# Patient Record
Sex: FEMALE | Race: WHITE | Employment: PART TIME | URBAN - METROPOLITAN AREA
[De-identification: names, ages, dates, MRNs, and addresses within clinical notes are randomized per-mention and may not be internally consistent; named-entity substitution may affect disease eponyms.]

---

## 2017-01-16 ENCOUNTER — OFFICE VISIT (OUTPATIENT)
Dept: ORTHOPEDIC SURGERY | Age: 27
End: 2017-01-16

## 2017-01-16 ENCOUNTER — TELEPHONE (OUTPATIENT)
Dept: INTERNAL MEDICINE | Age: 27
End: 2017-01-16

## 2017-01-16 VITALS
HEART RATE: 75 BPM | WEIGHT: 115 LBS | SYSTOLIC BLOOD PRESSURE: 105 MMHG | DIASTOLIC BLOOD PRESSURE: 71 MMHG | BODY MASS INDEX: 18.48 KG/M2 | HEIGHT: 66 IN

## 2017-01-16 DIAGNOSIS — M89.8X1 PAIN OF RIGHT CLAVICLE: ICD-10-CM

## 2017-01-16 DIAGNOSIS — T14.8XXA CONTUSION OF CLAVICLE, RIGHT, INITIAL ENCOUNTER: Primary | ICD-10-CM

## 2017-01-16 DIAGNOSIS — M25.511 ACUTE PAIN OF RIGHT SHOULDER: Primary | ICD-10-CM

## 2017-01-16 PROCEDURE — 73000 X-RAY EXAM OF COLLAR BONE: CPT | Performed by: INTERNAL MEDICINE

## 2017-01-16 PROCEDURE — 99214 OFFICE O/P EST MOD 30 MIN: CPT | Performed by: INTERNAL MEDICINE

## 2017-01-16 PROCEDURE — MISC325 DJO DELUXE SHOULDER SLING: Performed by: INTERNAL MEDICINE

## 2017-01-17 ENCOUNTER — TELEPHONE (OUTPATIENT)
Dept: ORTHOPEDIC SURGERY | Age: 27
End: 2017-01-17

## 2018-02-14 ENCOUNTER — TELEPHONE (OUTPATIENT)
Dept: ORTHOPEDIC SURGERY | Age: 28
End: 2018-02-14

## 2018-02-14 NOTE — TELEPHONE ENCOUNTER
Faxed medical records for [facility] to [facility] @ [phone] and [address] @ [phone]

## 2018-03-19 ENCOUNTER — TELEPHONE (OUTPATIENT)
Dept: ORTHOPEDIC SURGERY | Age: 28
End: 2018-03-19

## 2018-05-07 ENCOUNTER — TELEPHONE (OUTPATIENT)
Dept: ORTHOPEDIC SURGERY | Age: 28
End: 2018-05-07

## 2019-05-23 ENCOUNTER — OFFICE VISIT (OUTPATIENT)
Dept: INTERNAL MEDICINE CLINIC | Age: 29
End: 2019-05-23
Payer: MEDICAID

## 2019-05-23 VITALS
SYSTOLIC BLOOD PRESSURE: 112 MMHG | WEIGHT: 116 LBS | HEART RATE: 119 BPM | DIASTOLIC BLOOD PRESSURE: 64 MMHG | OXYGEN SATURATION: 99 % | BODY MASS INDEX: 18.72 KG/M2

## 2019-05-23 DIAGNOSIS — R53.82 CHRONIC FATIGUE: ICD-10-CM

## 2019-05-23 DIAGNOSIS — G90.512 COMPLEX REGIONAL PAIN SYNDROME TYPE 1 OF LEFT UPPER EXTREMITY: ICD-10-CM

## 2019-05-23 DIAGNOSIS — M25.532 CHRONIC WRIST PAIN, LEFT: ICD-10-CM

## 2019-05-23 DIAGNOSIS — D50.8 OTHER IRON DEFICIENCY ANEMIA: ICD-10-CM

## 2019-05-23 DIAGNOSIS — G89.29 CHRONIC WRIST PAIN, LEFT: ICD-10-CM

## 2019-05-23 DIAGNOSIS — G90.529 COMPLEX REGIONAL PAIN SYNDROME TYPE 1 OF LOWER EXTREMITY, UNSPECIFIED LATERALITY: ICD-10-CM

## 2019-05-23 DIAGNOSIS — Z00.00 ROUTINE GENERAL MEDICAL EXAMINATION AT A HEALTH CARE FACILITY: Primary | ICD-10-CM

## 2019-05-23 DIAGNOSIS — D22.9 ATYPICAL NEVUS: ICD-10-CM

## 2019-05-23 PROCEDURE — 99395 PREV VISIT EST AGE 18-39: CPT | Performed by: INTERNAL MEDICINE

## 2019-05-23 RX ORDER — AMITRIPTYLINE HYDROCHLORIDE 25 MG/1
25 TABLET, FILM COATED ORAL NIGHTLY
Qty: 30 TABLET | Refills: 0
Start: 2019-05-23

## 2019-05-23 SDOH — HEALTH STABILITY: MENTAL HEALTH: HOW OFTEN DO YOU HAVE A DRINK CONTAINING ALCOHOL?: NEVER

## 2019-05-23 ASSESSMENT — PATIENT HEALTH QUESTIONNAIRE - PHQ9
SUM OF ALL RESPONSES TO PHQ9 QUESTIONS 1 & 2: 0
SUM OF ALL RESPONSES TO PHQ QUESTIONS 1-9: 0
SUM OF ALL RESPONSES TO PHQ QUESTIONS 1-9: 0
1. LITTLE INTEREST OR PLEASURE IN DOING THINGS: 0
2. FEELING DOWN, DEPRESSED OR HOPELESS: 0

## 2019-05-23 NOTE — PROGRESS NOTES
Subjective:      Patient ID: Rylan Rubio is a 34 y.o. y.o. female. HPI    Rylan Rubio is here for a physical.    Chief Complaint   Patient presents with    Annual Exam       Vitals:    05/23/19 1555   BP: 112/64   Pulse: 119   SpO2: 99%       Wt Readings from Last 3 Encounters:   05/23/19 116 lb (52.6 kg)   01/16/17 115 lb (52.2 kg)   09/12/16 114 lb (51.7 kg)       Past Medical History:   Diagnosis Date    Cerebral palsy (HCC)     Complex regional pain syndrome     Iron deficiency anemia        Past Surgical History:   Procedure Laterality Date    SPINE SURGERY      Spinal stimulator    WISDOM TOOTH EXTRACTION         History reviewed. No pertinent family history. Social History     Tobacco Use    Smoking status: Never Smoker    Smokeless tobacco: Never Used   Substance Use Topics    Alcohol use: Never     Frequency: Never    Drug use: No       Immunization History   Administered Date(s) Administered    IPV (Ipol) 12/30/2015    MMR 12/23/2015    Td, unspecified formulation 12/15/2015    Yellow Fever 12/30/2015       Health Maintenance   Topic Date Due    Varicella Vaccine (1 of 2 - 13+ 2-dose series) 08/24/2005    HPV vaccine (1 - Female 3-dose series) 08/24/2007    HIV screen  08/24/2007    Cervical cancer screen  04/29/2017    DTaP/Tdap/Td vaccine (1 - Tdap) 06/13/2019 (Originally 12/16/2015)    Flu vaccine (Season Ended) 09/01/2019    Pneumococcal 0-64 years Vaccine  Aged Out       Discussed over the counter medication with patient. Discussed use, benefit, and side effects of prescribed medications. Barriers to medication compliance addressed. Allpatient questions answered. Pt voiced understanding. Medications reviewed and patient understands. Questions answered    Review of Systems   Constitutional: Negative for activity change, appetite change, chills, diaphoresis, fatigue, fever and unexpected weight change.    HENT: Negative for congestion, dental problem, drooling, ear discharge, ear pain, facial swelling, hearing loss, mouth sores, nosebleeds, postnasal drip, rhinorrhea, sinus pressure, sneezing, sore throat, tinnitus, trouble swallowing and voice change. Eyes: Negative for photophobia, pain, discharge, redness, itching and visual disturbance. Respiratory: Negative for apnea, cough, choking, chest tightness, shortness of breath, wheezing and stridor. Cardiovascular: Negative for chest pain, palpitations and leg swelling. Gastrointestinal: Negative for abdominal distention, abdominal pain, anal bleeding, blood in stool, constipation, diarrhea, nausea, rectal pain and vomiting. Genitourinary: Negative for decreased urine volume, difficulty urinating, dyspareunia, dysuria, enuresis, flank pain, frequency, genital sores, hematuria, menstrual problem, pelvic pain, urgency, vaginal bleeding, vaginal discharge and vaginal pain. Musculoskeletal: Negative for arthralgias, back pain, gait problem, joint swelling, myalgias, neck pain and neck stiffness. Skin: Negative for color change, pallor, rash and wound. Neurological: Negative for dizziness, tremors, seizures, syncope, facial asymmetry, speech difficulty, weakness, light-headedness, numbness and headaches. Hematological: Negative for adenopathy. Does not bruise/bleed easily. Objective:   Physical Exam   Constitutional: She is oriented to person, place, and time. She appears well-developed and well-nourished. HENT:   Head: Normocephalic and atraumatic. Right Ear: Tympanic membrane and ear canal normal.   Left Ear: Tympanic membrane and ear canal normal.   Nose: Nose normal.   Mouth/Throat: Uvula is midline, oropharynx is clear and moist and mucous membranes are normal.   Eyes: Pupils are equal, round, and reactive to light. Conjunctivae, EOM and lids are normal.   Fundi exam is normal   Neck: Normal range of motion and full passive range of motion without pain. No JVD present.  No muscular tenderness present. Carotid bruit is not present. No tracheal deviation and no edema present. No thyroid mass and no thyromegaly present. Cardiovascular: Normal rate, regular rhythm, normal heart sounds and intact distal pulses. No murmur heard. Pulmonary/Chest: Effort normal and breath sounds normal.   Patient refuses breast exam     Abdominal: Soft. Bowel sounds are normal. There is no hepatosplenomegaly. There is no tenderness. No hernia. Hernia confirmed negative in the ventral area. Genitourinary: No breast tenderness or discharge. Genitourinary Comments: Patient refuses pap   Musculoskeletal: Normal range of motion. Neurological: She is alert and oriented to person, place, and time. She has normal strength and normal reflexes. No cranial nerve deficit or sensory deficit. Skin: Skin is warm and dry. Patient advised to do a monthly mole check       Assessment:      See ProblemList assessment and plan       Plan:      Routine general medical examination at a health care facility  Reviewed diet and exercise  Check labs  Reviewed immunizations  Referral to Gyn      Iron deficiency anemia  Check labs         Patient engaged in shared decision making. Information given to evaluate options of treatment, understand what is needed and discuss importance of following plan.

## 2019-05-24 ASSESSMENT — ENCOUNTER SYMPTOMS
VOMITING: 0
TROUBLE SWALLOWING: 0
SORE THROAT: 0
EYE REDNESS: 0
NAUSEA: 0
EYE PAIN: 0
VOICE CHANGE: 0
EYE DISCHARGE: 0
FACIAL SWELLING: 0
CONSTIPATION: 0
ABDOMINAL DISTENTION: 0
RECTAL PAIN: 0
STRIDOR: 0
SHORTNESS OF BREATH: 0
EYE ITCHING: 0
COUGH: 0
CHEST TIGHTNESS: 0
DIARRHEA: 0
ABDOMINAL PAIN: 0
WHEEZING: 0
RHINORRHEA: 0
ANAL BLEEDING: 0
BACK PAIN: 0
APNEA: 0
SINUS PRESSURE: 0
BLOOD IN STOOL: 0
CHOKING: 0
PHOTOPHOBIA: 0
COLOR CHANGE: 0

## 2019-05-24 NOTE — ASSESSMENT & PLAN NOTE
Reviewed diet and exercise  Check labs  Reviewed immunizations  Referral to [facility]

## 2019-05-27 RX ORDER — IRON ASPGLY,PS/C/SUCCINIC ACID 150-50-50
1 CAPSULE ORAL 2 TIMES DAILY
Qty: 60 CAPSULE | Refills: 2 | Status: ON HOLD
Start: 2019-05-27 | End: 2020-02-21

## 2019-06-24 ENCOUNTER — TELEPHONE (OUTPATIENT)
Dept: GYNECOLOGY | Age: 29
End: 2019-06-24

## 2019-06-24 NOTE — TELEPHONE ENCOUNTER
Patient is calling to request an earlier appointment date. She will be a NEW patient on July 16th. Patient states her breasts are very dense and she feels lumps. Patient states she has lost 20 pounds. Patient would like to be sooner. Patient can be reached at [phone].

## 2019-06-25 ENCOUNTER — OFFICE VISIT (OUTPATIENT)
Dept: GYNECOLOGY | Age: 29
End: 2019-06-25
Payer: MEDICAID

## 2019-06-25 VITALS
OXYGEN SATURATION: 98 % | RESPIRATION RATE: 16 BRPM | HEART RATE: 80 BPM | DIASTOLIC BLOOD PRESSURE: 62 MMHG | TEMPERATURE: 97.3 F | HEIGHT: 66 IN | WEIGHT: 110 LBS | SYSTOLIC BLOOD PRESSURE: 100 MMHG | BODY MASS INDEX: 17.68 KG/M2

## 2019-06-25 DIAGNOSIS — N63.20 LEFT BREAST LUMP: ICD-10-CM

## 2019-06-25 DIAGNOSIS — N92.0 MENORRHAGIA WITH REGULAR CYCLE: ICD-10-CM

## 2019-06-25 DIAGNOSIS — N64.59 INVERTED NIPPLE: ICD-10-CM

## 2019-06-25 DIAGNOSIS — Z01.419 WELL WOMAN EXAM WITH ROUTINE GYNECOLOGICAL EXAM: Primary | ICD-10-CM

## 2019-06-25 PROCEDURE — 99385 PREV VISIT NEW AGE 18-39: CPT | Performed by: OBSTETRICS & GYNECOLOGY

## 2019-06-25 RX ORDER — METHADONE HYDROCHLORIDE 5 MG/1
TABLET ORAL
COMMUNITY
Start: 2019-06-17 | End: 2019-08-13

## 2019-06-25 ASSESSMENT — ENCOUNTER SYMPTOMS
RESPIRATORY NEGATIVE: 1
GASTROINTESTINAL NEGATIVE: 1
EYES NEGATIVE: 1

## 2019-06-25 NOTE — PROGRESS NOTES
Subjective:      Patient ID: Sloane Christensen is a 34 y.o. female. Patient is here for annual. Patient with hx inverted nipples bilaterally-feels like getting worse. Hx breast lump left side. Feels like losing weight. Does have a chronic pain syndrome. States cycles slightly heavy-questions about hormones. Review of Systems   Constitutional: Positive for unexpected weight change. HENT: Negative. Eyes: Negative. Respiratory: Negative. Cardiovascular: Negative. Gastrointestinal: Negative. Genitourinary: Positive for menstrual problem. Musculoskeletal: Positive for arthralgias and myalgias. Skin: Negative. Neurological: Negative. Psychiatric/Behavioral: Negative.       Date of Birth 1990  Past Medical History:   Diagnosis Date    Cerebral palsy ([facility] [address]     Complex regional pain syndrome     Iron deficiency anemia      Past Surgical History:   Procedure Laterality Date    SPINE SURGERY      Spinal stimulator    WISDOM TOOTH EXTRACTION       OB History    Para Term  AB Living   0 0 0 0 0 0   SAB TAB Ectopic Molar Multiple Live Births   0 0 0 0 0 0     Social History     Socioeconomic History    Marital status: Single     Spouse name: Not on file    Number of children: 0    Years of education: Not on file    Highest education level: Not on file   Occupational History    Occupation: student   Social Needs    Financial resource strain: Not on file    Food insecurity:     Worry: Not on file     Inability: Not on file    Transportation needs:     Medical: Not on file     Non-medical: Not on file   Tobacco Use    Smoking status: Never Smoker    Smokeless tobacco: Never Used   Substance and Sexual Activity    Alcohol use: Never     Frequency: Never    Drug use: No    Sexual activity: Not on file   Lifestyle    Physical activity:     Days per week: Not on file     Minutes per session: Not on file    Stress: Not on file   Relationships    Social connections: Talks on phone: Not on file     Gets together: Not on file     Attends Protestant service: Not on file     Active member of club or organization: Not on file     Attends meetings of clubs or organizations: Not on file     Relationship status: Not on file    Intimate partner violence:     Fear of current or ex partner: Not on file     Emotionally abused: Not on file     Physically abused: Not on file     Forced sexual activity: Not on file   Other Topics Concern    Not on file   Social History Narrative    Exercise: swim, running    Seatbelt use: yes    Caffeine intake: one cup coffee daily                 Allergies   Allergen Reactions    Phenergan [Promethazine]      Pt mother had a really bad reaction and was hospitalized pt has always been told to say she could not have it     Outpatient Medications Marked as Taking for the 6/25/19 encounter (Office Visit) with Liane Valenzuela MD   Medication Sig Dispense Refill    methadone (DOLOPHINE) 5 MG tablet       Fe-Succ Ac-C-Thre Ac (NIFEREX) 150-50-50 MG CAPS Take 1 capsule by mouth 2 times daily 60 capsule 2    amitriptyline (ELAVIL) 25 MG tablet Take 1 tablet by mouth nightly 30 tablet 0     History reviewed. No pertinent family history. /62   Pulse 80   Temp 97.3 °F (36.3 °C)   Resp 16   Ht 5' 6\" (1.676 m)   Wt 110 lb (49.9 kg)   SpO2 98%   BMI 17.75 kg/m²       Objective:   Physical Exam   Constitutional: She is oriented to person, place, and time. She appears well-developed and well-nourished. No distress. HENT:   Head: Normocephalic and atraumatic. Eyes: Pupils are equal, round, and reactive to light. EOM are normal.   Neck: Normal range of motion. Neck supple. No thyromegaly present. Cardiovascular: Normal rate, regular rhythm and normal heart sounds. Exam reveals no gallop and no friction rub. No murmur heard. Pulmonary/Chest: Effort normal and breath sounds normal. No respiratory distress. She has no wheezes. She has no rales. Right breast exhibits inverted nipple. Left breast exhibits inverted nipple and mass. No breast tenderness, discharge or bleeding. Abdominal: Soft. She exhibits no distension and no mass. There is no hepatomegaly. There is no tenderness. There is no rebound and no guarding. No hernia. Genitourinary: Vagina normal and uterus normal. Rectal exam shows no external hemorrhoid and no fissure. No breast tenderness, discharge or bleeding. Pelvic exam was performed with patient supine. No labial fusion. There is no rash, tenderness, lesion or injury on the right labia. There is no rash, tenderness, lesion or injury on the left labia. Uterus is not deviated, not enlarged, not fixed and not tender. Cervix exhibits no motion tenderness, no discharge and no friability. Right adnexum displays no mass, no tenderness and no fullness. Left adnexum displays no mass, no tenderness and no fullness. No erythema, tenderness or bleeding in the vagina. No foreign body in the vagina. No signs of injury around the vagina. No vaginal discharge found. Genitourinary Comments: Normal urethral meatus, nl urethra, nl bladder. Musculoskeletal: Normal range of motion. Lymphadenopathy:        Right: No inguinal adenopathy present. Left: No inguinal adenopathy present. Neurological: She is alert and oriented to person, place, and time. She has normal reflexes. Skin: Skin is warm and dry. Psychiatric: She has a normal mood and affect. Her behavior is normal. Judgment and thought content normal.       Assessment:      1. Annual  2. Inverted nipples  3. Left breast lump 8 mm  4. Menorrhagia  5. Weight loss      Plan:      1. Pap, calcium, exercise  2. And 3. Referral to Dr. Alem Watts for evaluation-may need imaging  4. Check hormone levels  5.  Continue to see PCP        Erasmo Clifford MD

## 2019-07-02 ENCOUNTER — HOSPITAL ENCOUNTER (OUTPATIENT)
Dept: ULTRASOUND IMAGING | Age: 29
Discharge: HOME | End: 2019-07-02
Payer: MEDICAID

## 2019-07-02 ENCOUNTER — HOSPITAL ENCOUNTER (OUTPATIENT)
Dept: MAMMOGRAPHY | Age: 29
Discharge: HOME | End: 2019-07-02
Payer: MEDICAID

## 2019-07-02 ENCOUNTER — OFFICE VISIT (OUTPATIENT)
Dept: SURGERY | Age: 29
End: 2019-07-02
Payer: MEDICAID

## 2019-07-02 ENCOUNTER — TELEPHONE (OUTPATIENT)
Dept: SURGERY | Age: 29
End: 2019-07-02

## 2019-07-02 VITALS
SYSTOLIC BLOOD PRESSURE: 105 MMHG | HEART RATE: 81 BPM | WEIGHT: 113.6 LBS | HEIGHT: 66 IN | BODY MASS INDEX: 18.26 KG/M2 | DIASTOLIC BLOOD PRESSURE: 69 MMHG

## 2019-07-02 DIAGNOSIS — N63.20 LEFT BREAST MASS: ICD-10-CM

## 2019-07-02 DIAGNOSIS — R92.8 ABNORMAL MAMMOGRAM: ICD-10-CM

## 2019-07-02 DIAGNOSIS — N64.59 INVERSION OF BOTH NIPPLES: ICD-10-CM

## 2019-07-02 DIAGNOSIS — N63.20 LEFT BREAST MASS: Primary | ICD-10-CM

## 2019-07-02 PROCEDURE — G8419 CALC BMI OUT NRM PARAM NOF/U: HCPCS | Performed by: SURGERY

## 2019-07-02 PROCEDURE — 99243 OFF/OP CNSLTJ NEW/EST LOW 30: CPT | Performed by: SURGERY

## 2019-07-02 PROCEDURE — G0279 TOMOSYNTHESIS, MAMMO: HCPCS

## 2019-07-02 PROCEDURE — G8427 DOCREV CUR MEDS BY ELIG CLIN: HCPCS | Performed by: SURGERY

## 2019-07-02 PROCEDURE — 76642 ULTRASOUND BREAST LIMITED: CPT

## 2019-07-02 NOTE — TELEPHONE ENCOUNTER
Breast History:  History of Previous Breast Biopsy:no  Self Breast Exams Completed:yes-  Family History of Breast Cancer:yes-  Great grandmother and her sisters    Family History of Other Cancers:yes-  Pateral grandfather  lung cancer   Ashkenazi [employer] Decent:no  Bra Size: 28 A    Gyne History:  : 0,   Para: 0  Age of Menarche: 17 years  Age of Menopause: N/A years   Age of first live Birth: 0 years  History of Hysterectomy / ENRIQUE-BSO: No  History of OCP's/HRT:No   When and how long:  No  Family History or personal history of Ovarian Cancer: Unknown if cervical or ovarian cancer

## 2019-07-02 NOTE — PROGRESS NOTES
Subjective:      Patient ID: Muna Waters is a 34 y.o. female. HPI   Chief Complaint   Patient presents with    New Patient     Patient presents from Dr Odilia Watkins for left breast lump and inverted nipples     Patient has a 4 year history of bilateral inverted nipples, and she feels like it is getting worse. Has occasional bilateral clear nipple discharge with compression. She also has a left breast mass x 6 months, and this was imaged by u/s 6 months ago in [place]. Here for follow up. The mass has not changed. Breast History:  History of Previous Breast Biopsy:no  Self Breast Exams Completed:yes-  Family History of Breast Cancer:yes-  Great grandmother and her great GM's sisters    Family History of Other Cancers:yes-  Pateral grandfather  lung cancer   Ashkenazi [employer] Decent:no  Bra Size: 28 A     Gyne History:  : 0,   Para: 0  Age of Menarche: 17 years  Age of Menopause: N/A years   Age of first live Birth: 2 years  History of Hysterectomy / ENRIQUE-BSO: No  History of OCP's/HRT:No   When and how long:  No  Family History or personal history of Ovarian Cancer: Unknown if cervical or ovarian cancer       Past Medical History:   Diagnosis Date    Cerebral palsy ([address]     Complex regional pain syndrome     Iron deficiency anemia        Past Surgical History:   Procedure Laterality Date    SPINE SURGERY      Spinal stimulator    WISDOM TOOTH EXTRACTION         Current Outpatient Medications   Medication Sig Dispense Refill    methadone (DOLOPHINE) 5 MG tablet       Fe-Succ Ac-C-Thre Ac (NIFEREX) 150-50-50 MG CAPS Take 1 capsule by mouth 2 times daily 60 capsule 2    amitriptyline (ELAVIL) 25 MG tablet Take 1 tablet by mouth nightly 30 tablet 0     No current facility-administered medications for this visit.         Social History     Socioeconomic History    Marital status: Single     Spouse name: Not on file    Number of children: 0    Years of education: Not on file    Highest education There is no hepatosplenomegaly. Musculoskeletal: Normal range of motion. She exhibits no edema or tenderness. Lymphadenopathy:     She has no cervical adenopathy. She has no axillary adenopathy. Neurological: She is alert and oriented to person, place, and time. No cranial nerve deficit. Coordination normal.   Skin: Skin is warm and dry. No rash noted. She is not diaphoretic. No erythema. No pallor. Psychiatric: She has a normal mood and affect. Her behavior is normal. Judgment and thought content normal.   bilateral nipple inversion, no nipple masses, no skin lesions or discharge noted. Left breast with 8 mm round mass 2:00, 6 cmfn. Right breast - no masses    Assessment:       Diagnosis Orders   1. Left breast mass     2. Inversion of both nipples             Plan:      I recommend we obtain a bilateral diagnostic mammogram and left breast u/s today. Further recommendations based on imaging findings.          Racquel Gayle MD

## 2019-07-05 ENCOUNTER — HOSPITAL ENCOUNTER (OUTPATIENT)
Dept: MAMMOGRAPHY | Age: 29
Discharge: HOME | End: 2019-07-05
Payer: MEDICAID

## 2019-07-05 ENCOUNTER — HOSPITAL ENCOUNTER (OUTPATIENT)
Dept: ULTRASOUND IMAGING | Age: 29
Discharge: HOME | End: 2019-07-05
Payer: MEDICAID

## 2019-07-05 DIAGNOSIS — R92.8 ABNORMAL MAMMOGRAM: ICD-10-CM

## 2019-07-05 DIAGNOSIS — N63.0 BREAST MASS: ICD-10-CM

## 2019-07-05 PROCEDURE — 77065 DX MAMMO INCL CAD UNI: CPT

## 2019-07-05 PROCEDURE — 2709999900 US BREAST BIOPSY W LOC DEVICE 1ST LESION LEFT

## 2019-07-05 PROCEDURE — 88305 TISSUE EXAM BY PATHOLOGIST: CPT

## 2019-07-09 ENCOUNTER — OFFICE VISIT (OUTPATIENT)
Dept: SURGERY | Age: 29
End: 2019-07-09
Payer: MEDICAID

## 2019-07-09 VITALS
WEIGHT: 113 LBS | DIASTOLIC BLOOD PRESSURE: 66 MMHG | HEART RATE: 88 BPM | SYSTOLIC BLOOD PRESSURE: 109 MMHG | BODY MASS INDEX: 18.24 KG/M2

## 2019-07-09 DIAGNOSIS — N63.20 LEFT BREAST MASS: Primary | ICD-10-CM

## 2019-07-09 PROCEDURE — 1036F TOBACCO NON-USER: CPT | Performed by: SURGERY

## 2019-07-09 PROCEDURE — G8427 DOCREV CUR MEDS BY ELIG CLIN: HCPCS | Performed by: SURGERY

## 2019-07-09 PROCEDURE — 99214 OFFICE O/P EST MOD 30 MIN: CPT | Performed by: SURGERY

## 2019-07-09 PROCEDURE — G8419 CALC BMI OUT NRM PARAM NOF/U: HCPCS | Performed by: SURGERY

## 2019-07-09 NOTE — PATIENT INSTRUCTIONS
Pathology results were discussed  Recommendation is excision of fibroadenoma left breast  Surgical procedure for excision was explained  Outpatient procedure with MAC anesthesia  Risks and benefits of surgery were explained  Consent for surgery was signed  Avoid Vitamin E, Fish Oil and Tumeric prior to surgery

## 2019-07-10 ENCOUNTER — TELEPHONE (OUTPATIENT)
Dept: INTERNAL MEDICINE CLINIC | Age: 29
End: 2019-07-10

## 2019-07-29 ENCOUNTER — OFFICE VISIT (OUTPATIENT)
Dept: INTERNAL MEDICINE CLINIC | Age: 29
End: 2019-07-29
Payer: MEDICAID

## 2019-07-29 VITALS
OXYGEN SATURATION: 97 % | WEIGHT: 105 LBS | HEIGHT: 66 IN | BODY MASS INDEX: 16.88 KG/M2 | DIASTOLIC BLOOD PRESSURE: 62 MMHG | HEART RATE: 90 BPM | SYSTOLIC BLOOD PRESSURE: 102 MMHG

## 2019-07-29 DIAGNOSIS — G90.529 COMPLEX REGIONAL PAIN SYNDROME TYPE 1 OF LOWER EXTREMITY, UNSPECIFIED LATERALITY: ICD-10-CM

## 2019-07-29 DIAGNOSIS — Z01.818 PREOP EXAMINATION: ICD-10-CM

## 2019-07-29 DIAGNOSIS — Z01.818 PRE-OP EXAMINATION: Primary | ICD-10-CM

## 2019-07-29 DIAGNOSIS — D50.8 OTHER IRON DEFICIENCY ANEMIA: ICD-10-CM

## 2019-07-29 DIAGNOSIS — N63.20 LEFT BREAST MASS: ICD-10-CM

## 2019-07-29 PROCEDURE — 93000 ELECTROCARDIOGRAM COMPLETE: CPT | Performed by: NURSE PRACTITIONER

## 2019-07-29 PROCEDURE — 99244 OFF/OP CNSLTJ NEW/EST MOD 40: CPT | Performed by: NURSE PRACTITIONER

## 2019-07-29 PROCEDURE — G8419 CALC BMI OUT NRM PARAM NOF/U: HCPCS | Performed by: NURSE PRACTITIONER

## 2019-07-29 PROCEDURE — G8427 DOCREV CUR MEDS BY ELIG CLIN: HCPCS | Performed by: NURSE PRACTITIONER

## 2019-07-29 ASSESSMENT — ENCOUNTER SYMPTOMS
WHEEZING: 0
SINUS PAIN: 0
SHORTNESS OF BREATH: 0
BACK PAIN: 0
ABDOMINAL PAIN: 0
CONSTIPATION: 0
COUGH: 0
COLOR CHANGE: 0
DIARRHEA: 0
SINUS PRESSURE: 0

## 2019-07-30 ENCOUNTER — TELEPHONE (OUTPATIENT)
Dept: INTERNAL MEDICINE CLINIC | Age: 29
End: 2019-07-30

## 2019-07-30 RX ORDER — IRON POLYSACCHARIDE COMPLEX 150 MG
150 CAPSULE ORAL 2 TIMES DAILY
Qty: 60 CAPSULE | Refills: 3 | Status: SHIPPED | OUTPATIENT
Start: 2019-07-30

## 2019-08-01 ENCOUNTER — TELEPHONE (OUTPATIENT)
Dept: SURGERY | Age: 29
End: 2019-08-01

## 2019-08-07 ENCOUNTER — TELEPHONE (OUTPATIENT)
Dept: SURGERY | Age: 29
End: 2019-08-07

## 2019-08-12 NOTE — TELEPHONE ENCOUNTER
Spoke with patient and answered questions regarding surgery. She wishes to speak to anesthesia prior to surgery. She also wishes to for the to know that her spinal cord stimulator is a [employer] cervical cord stimulator.

## 2019-08-13 ENCOUNTER — ANESTHESIA EVENT (OUTPATIENT)
Dept: OPERATING ROOM | Age: 29
End: 2019-08-13
Payer: MEDICAID

## 2019-08-13 RX ORDER — M-VIT,TX,IRON,MINS/CALC/FOLIC 27MG-0.4MG
1 TABLET ORAL DAILY
COMMUNITY

## 2019-08-14 ENCOUNTER — ANESTHESIA (OUTPATIENT)
Dept: OPERATING ROOM | Age: 29
End: 2019-08-14
Payer: MEDICAID

## 2019-08-14 ENCOUNTER — HOSPITAL ENCOUNTER (OUTPATIENT)
Age: 29
Setting detail: OUTPATIENT SURGERY
Discharge: HOME | End: 2019-08-14
Attending: SURGERY | Admitting: SURGERY
Payer: MEDICAID

## 2019-08-14 VITALS
SYSTOLIC BLOOD PRESSURE: 104 MMHG | TEMPERATURE: 97.2 F | BODY MASS INDEX: 17.04 KG/M2 | OXYGEN SATURATION: 100 % | HEART RATE: 69 BPM | WEIGHT: 106 LBS | HEIGHT: 66 IN | RESPIRATION RATE: 14 BRPM | DIASTOLIC BLOOD PRESSURE: 62 MMHG

## 2019-08-14 VITALS
OXYGEN SATURATION: 100 % | SYSTOLIC BLOOD PRESSURE: 90 MMHG | RESPIRATION RATE: 11 BRPM | DIASTOLIC BLOOD PRESSURE: 57 MMHG

## 2019-08-14 DIAGNOSIS — N63.20 LEFT BREAST MASS: Primary | ICD-10-CM

## 2019-08-14 LAB — PREGNANCY, URINE: NEGATIVE

## 2019-08-14 PROCEDURE — 3700000001 HC ADD 15 MINUTES (ANESTHESIA): Performed by: SURGERY

## 2019-08-14 PROCEDURE — 19120 REMOVAL OF BREAST LESION: CPT | Performed by: SURGERY

## 2019-08-14 PROCEDURE — 2580000003 HC RX 258: Performed by: NURSE ANESTHETIST, CERTIFIED REGISTERED

## 2019-08-14 PROCEDURE — 2500000003 HC RX 250 WO HCPCS: Performed by: NURSE ANESTHETIST, CERTIFIED REGISTERED

## 2019-08-14 PROCEDURE — 88307 TISSUE EXAM BY PATHOLOGIST: CPT

## 2019-08-14 PROCEDURE — 7100000000 HC PACU RECOVERY - FIRST 15 MIN: Performed by: SURGERY

## 2019-08-14 PROCEDURE — 6360000002 HC RX W HCPCS: Performed by: NURSE ANESTHETIST, CERTIFIED REGISTERED

## 2019-08-14 PROCEDURE — 2580000003 HC RX 258: Performed by: SURGERY

## 2019-08-14 PROCEDURE — 3600000003 HC SURGERY LEVEL 3 BASE: Performed by: SURGERY

## 2019-08-14 PROCEDURE — 3700000000 HC ANESTHESIA ATTENDED CARE: Performed by: SURGERY

## 2019-08-14 PROCEDURE — 6360000002 HC RX W HCPCS: Performed by: ANESTHESIOLOGY

## 2019-08-14 PROCEDURE — 3600000005 HC SURGERY LEVEL 5 BASE: Performed by: SURGERY

## 2019-08-14 PROCEDURE — 7100000001 HC PACU RECOVERY - ADDTL 15 MIN: Performed by: SURGERY

## 2019-08-14 PROCEDURE — 84703 CHORIONIC GONADOTROPIN ASSAY: CPT

## 2019-08-14 PROCEDURE — 2709999900 HC NON-CHARGEABLE SUPPLY: Performed by: SURGERY

## 2019-08-14 PROCEDURE — 2580000003 HC RX 258: Performed by: ANESTHESIOLOGY

## 2019-08-14 PROCEDURE — 3600000015 HC SURGERY LEVEL 5 ADDTL 15MIN: Performed by: SURGERY

## 2019-08-14 PROCEDURE — 6370000000 HC RX 637 (ALT 250 FOR IP): Performed by: ANESTHESIOLOGY

## 2019-08-14 PROCEDURE — 3600000013 HC SURGERY LEVEL 3 ADDTL 15MIN: Performed by: SURGERY

## 2019-08-14 PROCEDURE — 2500000003 HC RX 250 WO HCPCS: Performed by: SURGERY

## 2019-08-14 RX ORDER — LIDOCAINE HYDROCHLORIDE 20 MG/ML
INJECTION, SOLUTION INFILTRATION; PERINEURAL PRN
Status: DISCONTINUED | OUTPATIENT
Start: 2019-08-14 | End: 2019-08-14 | Stop reason: SDUPTHER

## 2019-08-14 RX ORDER — APREPITANT 40 MG/1
40 CAPSULE ORAL ONCE
Status: COMPLETED | OUTPATIENT
Start: 2019-08-14 | End: 2019-08-14

## 2019-08-14 RX ORDER — MIDAZOLAM HYDROCHLORIDE 1 MG/ML
INJECTION INTRAMUSCULAR; INTRAVENOUS PRN
Status: DISCONTINUED | OUTPATIENT
Start: 2019-08-14 | End: 2019-08-14 | Stop reason: SDUPTHER

## 2019-08-14 RX ORDER — SODIUM CHLORIDE, SODIUM LACTATE, POTASSIUM CHLORIDE, CALCIUM CHLORIDE 600; 310; 30; 20 MG/100ML; MG/100ML; MG/100ML; MG/100ML
INJECTION, SOLUTION INTRAVENOUS CONTINUOUS PRN
Status: DISCONTINUED | OUTPATIENT
Start: 2019-08-14 | End: 2019-08-14 | Stop reason: SDUPTHER

## 2019-08-14 RX ORDER — FENTANYL CITRATE 50 UG/ML
INJECTION, SOLUTION INTRAMUSCULAR; INTRAVENOUS PRN
Status: DISCONTINUED | OUTPATIENT
Start: 2019-08-14 | End: 2019-08-14 | Stop reason: SDUPTHER

## 2019-08-14 RX ORDER — ONDANSETRON 2 MG/ML
INJECTION INTRAMUSCULAR; INTRAVENOUS PRN
Status: DISCONTINUED | OUTPATIENT
Start: 2019-08-14 | End: 2019-08-14 | Stop reason: SDUPTHER

## 2019-08-14 RX ORDER — ONDANSETRON 2 MG/ML
4 INJECTION INTRAMUSCULAR; INTRAVENOUS
Status: DISCONTINUED | OUTPATIENT
Start: 2019-08-14 | End: 2019-08-14 | Stop reason: HOSPADM

## 2019-08-14 RX ORDER — SCOLOPAMINE TRANSDERMAL SYSTEM 1 MG/1
1 PATCH, EXTENDED RELEASE TRANSDERMAL ONCE
Status: DISCONTINUED | OUTPATIENT
Start: 2019-08-14 | End: 2019-08-14 | Stop reason: HOSPADM

## 2019-08-14 RX ORDER — OXYCODONE HYDROCHLORIDE AND ACETAMINOPHEN 5; 325 MG/1; MG/1
1 TABLET ORAL EVERY 6 HOURS PRN
Qty: 10 TABLET | Refills: 0 | Status: SHIPPED | OUTPATIENT
Start: 2019-08-14 | End: 2019-08-17

## 2019-08-14 RX ORDER — SODIUM CHLORIDE, SODIUM LACTATE, POTASSIUM CHLORIDE, CALCIUM CHLORIDE 600; 310; 30; 20 MG/100ML; MG/100ML; MG/100ML; MG/100ML
INJECTION, SOLUTION INTRAVENOUS CONTINUOUS
Status: DISCONTINUED | OUTPATIENT
Start: 2019-08-14 | End: 2019-08-14 | Stop reason: HOSPADM

## 2019-08-14 RX ORDER — DEXAMETHASONE SODIUM PHOSPHATE 4 MG/ML
INJECTION, SOLUTION INTRA-ARTICULAR; INTRALESIONAL; INTRAMUSCULAR; INTRAVENOUS; SOFT TISSUE PRN
Status: DISCONTINUED | OUTPATIENT
Start: 2019-08-14 | End: 2019-08-14 | Stop reason: SDUPTHER

## 2019-08-14 RX ORDER — FENTANYL CITRATE 50 UG/ML
50 INJECTION, SOLUTION INTRAMUSCULAR; INTRAVENOUS EVERY 5 MIN PRN
Status: DISCONTINUED | OUTPATIENT
Start: 2019-08-14 | End: 2019-08-14 | Stop reason: HOSPADM

## 2019-08-14 RX ORDER — BUPIVACAINE HYDROCHLORIDE 5 MG/ML
INJECTION, SOLUTION EPIDURAL; INTRACAUDAL PRN
Status: DISCONTINUED | OUTPATIENT
Start: 2019-08-14 | End: 2019-08-14 | Stop reason: ALTCHOICE

## 2019-08-14 RX ORDER — PROPOFOL 10 MG/ML
INJECTION, EMULSION INTRAVENOUS PRN
Status: DISCONTINUED | OUTPATIENT
Start: 2019-08-14 | End: 2019-08-14 | Stop reason: SDUPTHER

## 2019-08-14 RX ORDER — MAGNESIUM HYDROXIDE 1200 MG/15ML
LIQUID ORAL CONTINUOUS PRN
Status: COMPLETED | OUTPATIENT
Start: 2019-08-14 | End: 2019-08-14

## 2019-08-14 RX ORDER — FENTANYL CITRATE 50 UG/ML
25 INJECTION, SOLUTION INTRAMUSCULAR; INTRAVENOUS EVERY 5 MIN PRN
Status: DISCONTINUED | OUTPATIENT
Start: 2019-08-14 | End: 2019-08-14 | Stop reason: HOSPADM

## 2019-08-14 RX ADMIN — ONDANSETRON 4 MG: 2 INJECTION INTRAMUSCULAR; INTRAVENOUS at 09:29

## 2019-08-14 RX ADMIN — SODIUM CHLORIDE, SODIUM LACTATE, POTASSIUM CHLORIDE, AND CALCIUM CHLORIDE: 600; 310; 30; 20 INJECTION, SOLUTION INTRAVENOUS at 09:23

## 2019-08-14 RX ADMIN — SODIUM CHLORIDE, POTASSIUM CHLORIDE, SODIUM LACTATE AND CALCIUM CHLORIDE: 600; 310; 30; 20 INJECTION, SOLUTION INTRAVENOUS at 08:30

## 2019-08-14 RX ADMIN — PHENYLEPHRINE HYDROCHLORIDE 100 MCG: 10 INJECTION, SOLUTION INTRAMUSCULAR; INTRAVENOUS; SUBCUTANEOUS at 10:06

## 2019-08-14 RX ADMIN — MIDAZOLAM HYDROCHLORIDE 2 MG: 2 INJECTION, SOLUTION INTRAMUSCULAR; INTRAVENOUS at 09:22

## 2019-08-14 RX ADMIN — DEXAMETHASONE SODIUM PHOSPHATE 8 MG: 4 INJECTION, SOLUTION INTRAMUSCULAR; INTRAVENOUS at 09:29

## 2019-08-14 RX ADMIN — APREPITANT 40 MG: 40 CAPSULE ORAL at 08:35

## 2019-08-14 RX ADMIN — LIDOCAINE HYDROCHLORIDE 50 MG: 20 INJECTION, SOLUTION INFILTRATION; PERINEURAL at 09:29

## 2019-08-14 RX ADMIN — PHENYLEPHRINE HYDROCHLORIDE 100 MCG: 10 INJECTION, SOLUTION INTRAMUSCULAR; INTRAVENOUS; SUBCUTANEOUS at 09:37

## 2019-08-14 RX ADMIN — PHENYLEPHRINE HYDROCHLORIDE 100 MCG: 10 INJECTION, SOLUTION INTRAMUSCULAR; INTRAVENOUS; SUBCUTANEOUS at 09:45

## 2019-08-14 RX ADMIN — PHENYLEPHRINE HYDROCHLORIDE 100 MCG: 10 INJECTION, SOLUTION INTRAMUSCULAR; INTRAVENOUS; SUBCUTANEOUS at 09:56

## 2019-08-14 RX ADMIN — FENTANYL CITRATE 50 MCG: 50 INJECTION INTRAMUSCULAR; INTRAVENOUS at 09:29

## 2019-08-14 RX ADMIN — PROPOFOL 200 MG: 10 INJECTION, EMULSION INTRAVENOUS at 09:29

## 2019-08-14 ASSESSMENT — PULMONARY FUNCTION TESTS
PIF_VALUE: 15
PIF_VALUE: 2
PIF_VALUE: 20
PIF_VALUE: 9
PIF_VALUE: 8
PIF_VALUE: 8
PIF_VALUE: 1
PIF_VALUE: 14
PIF_VALUE: 17
PIF_VALUE: 1
PIF_VALUE: 20
PIF_VALUE: 9
PIF_VALUE: 14
PIF_VALUE: 6
PIF_VALUE: 4
PIF_VALUE: 2
PIF_VALUE: 19
PIF_VALUE: 9
PIF_VALUE: 3
PIF_VALUE: 8
PIF_VALUE: 14
PIF_VALUE: 15
PIF_VALUE: 10
PIF_VALUE: 18
PIF_VALUE: 2
PIF_VALUE: 9
PIF_VALUE: 2
PIF_VALUE: 3
PIF_VALUE: 8
PIF_VALUE: 9
PIF_VALUE: 3
PIF_VALUE: 1
PIF_VALUE: 4
PIF_VALUE: 2
PIF_VALUE: 9
PIF_VALUE: 7
PIF_VALUE: 1
PIF_VALUE: 9
PIF_VALUE: 8
PIF_VALUE: 1
PIF_VALUE: 3
PIF_VALUE: 15

## 2019-08-14 NOTE — H&P
Beatriz Damon    [id number]    [facility] Same Day Surgery Update H & P  Department of General Surgery   Surgical Service   Pre-operative History and Physical  Last H & P within the last 30 days. DIAGNOSIS:   LEFT BREAST MASS    PROCEDURE:  UT REMOVAL OF BREAST LESION [19120] (LEFT BREAST EXCISIONAL BIOPSY)     HISTORY OF PRESENT ILLNESS:    Patient with palpable left breast mass presents today for excisional biopsy.       Past Medical History:        Diagnosis Date    Cerebral palsy ([address]     Complex regional pain syndrome     Iron deficiency anemia     Kidney stone     TIA (transient ischemic attack)     possible 2015     Past Surgical History:        Procedure Laterality Date    EYE SURGERY      SPINE SURGERY      Spinal stimulator    WISDOM TOOTH EXTRACTION       Past Social History:  Social History     Socioeconomic History    Marital status: Single     Spouse name: None    Number of children: 0    Years of education: None    Highest education level: None   Occupational History    Occupation: student   Social Needs    Financial resource strain: None    Food insecurity:     Worry: None     Inability: None    Transportation needs:     Medical: None     Non-medical: None   Tobacco Use    Smoking status: Never Smoker    Smokeless tobacco: Never Used   Substance and Sexual Activity    Alcohol use: Never     Frequency: Never    Drug use: No    Sexual activity: None   Lifestyle    Physical activity:     Days per week: None     Minutes per session: None    Stress: None   Relationships    Social connections:     Talks on phone: None     Gets together: None     Attends Baptist service: None     Active member of club or organization: None     Attends meetings of clubs or organizations: None     Relationship status: None    Intimate partner violence:     Fear of current or ex partner: None     Emotionally abused: None     Physically abused: None     Forced sexual activity: None   Other

## 2019-08-14 NOTE — H&P
H&P Update    Patient's History and Physical from July 29, 2019 was reviewed. Patient examined. There has been no change.     Lee Beatty

## 2019-08-14 NOTE — ANESTHESIA POSTPROCEDURE EVALUATION
Department of Anesthesiology  Postprocedure Note    Patient: Palomo Donaldson  MRN: [id number]  YOB: 1990  Date of evaluation: 8/14/2019  Time:  11:49 AM     Procedure Summary     Date:  08/14/19 Room / Location:  [facility] OR 13 / [facility] OR    Anesthesia Start:  8323 Anesthesia Stop:  4424    Procedure:  LEFT BREAST EXCISIONAL BIOPSY (Left ) Diagnosis:  (LEFT BREAST MASS)    Surgeon:  Alton Carrizales MD Responsible Provider:  Rose Marie Gonzalez MD    Anesthesia Type:  general ASA Status:  1          Anesthesia Type: general    Eber Phase I: Eber Score: 10    Eber Phase II:      Last vitals: Reviewed and per EMR flowsheets.        Anesthesia Post Evaluation    Patient location during evaluation: bedside  Patient participation: complete - patient participated  Level of consciousness: awake and alert  Airway patency: patent  Nausea & Vomiting: no nausea and no vomiting  Complications: no  Cardiovascular status: blood pressure returned to baseline  Respiratory status: acceptable  Hydration status: stable

## 2019-08-19 ENCOUNTER — TELEPHONE (OUTPATIENT)
Dept: SURGERY | Age: 29
End: 2019-08-19

## 2019-08-19 NOTE — PROGRESS NOTES
CC: Incision check         HPI: Ms. Jovanny Bloom is a 34 y.o. woman who presents today with a yellow blister on the left lateral and right lateral aspect of her incision. She reports yellow drainage from the incision site 1 day ago. Mild redness and warmness around the site of incision. Denies incisional or surgical pain. She is status post left breast excisional biopsy on 8/14/2019, pathology revealed benign fibroadenoma. Review of procedure notes reveal surgery was uneventful. She states that she does perform routine self breast evaluations and has not noticed any new abnormalities such as masses, skin changes, color changes,nipple discharge, or changes to the nipple-areolar complex. Review of Systems    Past Medical History:   Diagnosis Date    Cerebral palsy ([address]     Complex regional pain syndrome     Iron deficiency anemia     Kidney stone     TIA (transient ischemic attack)     possible 2015       Past Surgical History:   Procedure Laterality Date    BREAST SURGERY Left 8/14/2019    LEFT BREAST EXCISIONAL BIOPSY performed by Karely Corley MD at [address]      Spinal stimulator    WISDOM TOOTH EXTRACTION         Allergies as of 08/20/2019 - Review Complete 08/20/2019   Allergen Reaction Noted    Phenergan [promethazine]  08/01/2013    Baclofen  08/13/2019    Mobic [meloxicam] Nausea And Vomiting 08/13/2019    Nitrous oxide Nausea And Vomiting 08/13/2019       Social History     Tobacco Use    Smoking status: Never Smoker    Smokeless tobacco: Never Used   Substance Use Topics    Alcohol use: Never     Frequency: Never    Drug use: No         [unfilled]  Medication documentation has been reviewed in the electronic medical record and patient office intake form.         REVIEW OF SYSTEMS:  Constitutional: Negative for fever  HENT: Negative for sore throat  Eyes: Negative for redness   Respiratory: Negative for dyspnea, cough  Cardiovascular:

## 2019-08-20 ENCOUNTER — OFFICE VISIT (OUTPATIENT)
Dept: SURGERY | Age: 29
End: 2019-08-20

## 2019-08-20 VITALS
HEART RATE: 90 BPM | HEIGHT: 66 IN | WEIGHT: 107 LBS | DIASTOLIC BLOOD PRESSURE: 62 MMHG | SYSTOLIC BLOOD PRESSURE: 96 MMHG | OXYGEN SATURATION: 100 % | BODY MASS INDEX: 17.19 KG/M2

## 2019-08-20 DIAGNOSIS — D24.2 BREAST FIBROADENOMA, LEFT: ICD-10-CM

## 2019-08-20 DIAGNOSIS — T81.41XA SUPERFICIAL INCISIONAL INFECTION OF SURGICAL SITE: Primary | ICD-10-CM

## 2019-08-20 PROCEDURE — 99024 POSTOP FOLLOW-UP VISIT: CPT | Performed by: NURSE PRACTITIONER

## 2019-08-20 RX ORDER — DOXYCYCLINE HYCLATE 100 MG
100 TABLET ORAL 2 TIMES DAILY
Qty: 14 TABLET | Refills: 0 | Status: SHIPPED | OUTPATIENT
Start: 2019-08-20 | End: 2019-08-27

## 2019-08-22 ENCOUNTER — TELEPHONE (OUTPATIENT)
Dept: SURGERY | Age: 29
End: 2019-08-22

## 2019-08-23 ENCOUNTER — OFFICE VISIT (OUTPATIENT)
Dept: SURGERY | Age: 29
End: 2019-08-23

## 2019-08-23 VITALS
WEIGHT: 107 LBS | DIASTOLIC BLOOD PRESSURE: 68 MMHG | SYSTOLIC BLOOD PRESSURE: 109 MMHG | BODY MASS INDEX: 17.27 KG/M2 | HEART RATE: 115 BPM

## 2019-08-23 DIAGNOSIS — D24.2 BREAST FIBROADENOMA, LEFT: Primary | ICD-10-CM

## 2019-08-23 PROCEDURE — 99024 POSTOP FOLLOW-UP VISIT: CPT | Performed by: SURGERY

## 2019-08-23 NOTE — PROGRESS NOTES
Chief Complaint   Patient presents with    Post-Op Check     Patient presents post op left breast mass. She is on Doxycycline     Patient is s/p left breast excisional biopsy 8/14/2019 for a benign fibroadenoma. She presented 8/20 with a yellow blister by her incision, placed on Doxycycline. Had a fever yesterday, feeling much better today. Wound clean, glue intact, no erythema, no drainage or blisters. Instructed on wound care, finish abx, f/u as needed.

## 2019-10-15 ENCOUNTER — OFFICE VISIT (OUTPATIENT)
Dept: DERMATOLOGY | Age: 29
End: 2019-10-15
Payer: MEDICAID

## 2019-10-15 DIAGNOSIS — D22.9 MULTIPLE BENIGN MELANOCYTIC NEVI: ICD-10-CM

## 2019-10-15 DIAGNOSIS — K13.0 ANGULAR CHEILITIS: Primary | ICD-10-CM

## 2019-10-15 DIAGNOSIS — D18.01 HEMANGIOMA OF SKIN: ICD-10-CM

## 2019-10-15 PROCEDURE — G8427 DOCREV CUR MEDS BY ELIG CLIN: HCPCS | Performed by: DERMATOLOGY

## 2019-10-15 PROCEDURE — 1036F TOBACCO NON-USER: CPT | Performed by: DERMATOLOGY

## 2019-10-15 PROCEDURE — G8419 CALC BMI OUT NRM PARAM NOF/U: HCPCS | Performed by: DERMATOLOGY

## 2019-10-15 PROCEDURE — 99203 OFFICE O/P NEW LOW 30 MIN: CPT | Performed by: DERMATOLOGY

## 2019-10-15 PROCEDURE — G8484 FLU IMMUNIZE NO ADMIN: HCPCS | Performed by: DERMATOLOGY

## 2019-10-15 RX ORDER — NYSTATIN 100000 U/G
CREAM TOPICAL
Qty: 30 G | Refills: 2 | Status: SHIPPED | OUTPATIENT
Start: 2019-10-15 | End: 2022-09-20 | Stop reason: SDUPTHER

## 2019-10-18 ENCOUNTER — TELEPHONE (OUTPATIENT)
Dept: INTERNAL MEDICINE CLINIC | Age: 29
End: 2019-10-18

## 2019-10-18 ENCOUNTER — OFFICE VISIT (OUTPATIENT)
Dept: INTERNAL MEDICINE CLINIC | Age: 29
End: 2019-10-18
Payer: MEDICAID

## 2019-10-18 VITALS
BODY MASS INDEX: 17.27 KG/M2 | HEART RATE: 89 BPM | SYSTOLIC BLOOD PRESSURE: 128 MMHG | DIASTOLIC BLOOD PRESSURE: 80 MMHG | WEIGHT: 107 LBS | OXYGEN SATURATION: 98 %

## 2019-10-18 DIAGNOSIS — Z51.89 WOUND CHECK, ABSCESS: Primary | ICD-10-CM

## 2019-10-18 DIAGNOSIS — L03.032 CELLULITIS OF TOE OF LEFT FOOT: ICD-10-CM

## 2019-10-18 PROCEDURE — G8419 CALC BMI OUT NRM PARAM NOF/U: HCPCS | Performed by: NURSE PRACTITIONER

## 2019-10-18 PROCEDURE — G8427 DOCREV CUR MEDS BY ELIG CLIN: HCPCS | Performed by: NURSE PRACTITIONER

## 2019-10-18 PROCEDURE — 1036F TOBACCO NON-USER: CPT | Performed by: NURSE PRACTITIONER

## 2019-10-18 PROCEDURE — 99213 OFFICE O/P EST LOW 20 MIN: CPT | Performed by: NURSE PRACTITIONER

## 2019-10-18 PROCEDURE — G8484 FLU IMMUNIZE NO ADMIN: HCPCS | Performed by: NURSE PRACTITIONER

## 2019-10-18 RX ORDER — SULFAMETHOXAZOLE AND TRIMETHOPRIM 800; 160 MG/1; MG/1
1 TABLET ORAL 2 TIMES DAILY
Qty: 20 TABLET | Refills: 0 | Status: SHIPPED | OUTPATIENT
Start: 2019-10-18 | End: 2019-10-28

## 2019-10-18 ASSESSMENT — ENCOUNTER SYMPTOMS
COUGH: 0
ABDOMINAL PAIN: 0
SINUS PAIN: 0
SINUS PRESSURE: 0
WHEEZING: 0
BACK PAIN: 0
DIARRHEA: 0
CONSTIPATION: 0
COLOR CHANGE: 0
SHORTNESS OF BREATH: 0

## 2019-10-18 ASSESSMENT — PATIENT HEALTH QUESTIONNAIRE - PHQ9
SUM OF ALL RESPONSES TO PHQ QUESTIONS 1-9: 0
1. LITTLE INTEREST OR PLEASURE IN DOING THINGS: 0
SUM OF ALL RESPONSES TO PHQ QUESTIONS 1-9: 0
SUM OF ALL RESPONSES TO PHQ9 QUESTIONS 1 & 2: 0
2. FEELING DOWN, DEPRESSED OR HOPELESS: 0

## 2019-10-21 LAB
GRAM STAIN RESULT: ABNORMAL
ORGANISM: ABNORMAL
WOUND/ABSCESS: ABNORMAL

## 2019-11-01 ENCOUNTER — TELEPHONE (OUTPATIENT)
Dept: INTERNAL MEDICINE CLINIC | Age: 29
End: 2019-11-01

## 2019-11-06 ENCOUNTER — APPOINTMENT (OUTPATIENT)
Dept: CT IMAGING | Age: 29
End: 2019-11-06
Payer: MEDICAID

## 2019-11-06 ENCOUNTER — APPOINTMENT (OUTPATIENT)
Dept: GENERAL RADIOLOGY | Age: 29
End: 2019-11-06
Payer: MEDICAID

## 2019-11-06 ENCOUNTER — OFFICE VISIT (OUTPATIENT)
Dept: INTERNAL MEDICINE CLINIC | Age: 29
End: 2019-11-06
Payer: MEDICAID

## 2019-11-06 ENCOUNTER — HOSPITAL ENCOUNTER (EMERGENCY)
Age: 29
Discharge: HOME | End: 2019-11-06
Attending: EMERGENCY MEDICINE
Payer: MEDICAID

## 2019-11-06 VITALS
SYSTOLIC BLOOD PRESSURE: 111 MMHG | BODY MASS INDEX: 17.19 KG/M2 | HEIGHT: 66 IN | RESPIRATION RATE: 15 BRPM | HEART RATE: 76 BPM | OXYGEN SATURATION: 100 % | WEIGHT: 107 LBS | DIASTOLIC BLOOD PRESSURE: 71 MMHG | TEMPERATURE: 98.2 F

## 2019-11-06 VITALS
HEART RATE: 46 BPM | BODY MASS INDEX: 16.95 KG/M2 | WEIGHT: 105 LBS | OXYGEN SATURATION: 85 % | SYSTOLIC BLOOD PRESSURE: 122 MMHG | DIASTOLIC BLOOD PRESSURE: 78 MMHG

## 2019-11-06 DIAGNOSIS — S16.1XXA STRAIN OF NECK MUSCLE, INITIAL ENCOUNTER: ICD-10-CM
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
## 2019-11-06 DIAGNOSIS — B37.31 VAGINAL YEAST INFECTION: ICD-10-CM

## 2019-11-06 DIAGNOSIS — L03.032 CELLULITIS OF TOE OF LEFT FOOT: ICD-10-CM

## 2019-11-06 LAB
ANION GAP SERPL CALCULATED.3IONS-SCNC: 12 MMOL/L (ref 3–16)
BUN BLDV-MCNC: 9 MG/DL (ref 7–20)
CALCIUM SERPL-MCNC: 9 MG/DL (ref 8.3–10.6)
CHLORIDE BLD-SCNC: 104 MMOL/L (ref 99–110)
CO2: 26 MMOL/L (ref 21–32)
CREAT SERPL-MCNC: 0.6 MG/DL (ref 0.6–1.1)
GFR AFRICAN AMERICAN: >60
GFR NON-AFRICAN AMERICAN: >60
GLUCOSE BLD-MCNC: 94 MG/DL (ref 70–99)
HCG QUALITATIVE: NEGATIVE
HCT VFR BLD CALC: 39.4 % (ref 36–48)
HEMOGLOBIN: 12.9 G/DL (ref 12–16)
MCH RBC QN AUTO: 31.8 PG (ref 26–34)
MCHC RBC AUTO-ENTMCNC: 32.8 G/DL (ref 31–36)
MCV RBC AUTO: 96.8 FL (ref 80–100)
PDW BLD-RTO: 13.1 % (ref 12.4–15.4)
PLATELET # BLD: 180 K/UL (ref 135–450)
PMV BLD AUTO: 9.7 FL (ref 5–10.5)
POTASSIUM REFLEX MAGNESIUM: 4.1 MMOL/L (ref 3.5–5.1)
RBC # BLD: 4.07 M/UL (ref 4–5.2)
SODIUM BLD-SCNC: 142 MMOL/L (ref 136–145)
WBC # BLD: 3.9 K/UL (ref 4–11)

## 2019-11-06 PROCEDURE — 96374 THER/PROPH/DIAG INJ IV PUSH: CPT

## 2019-11-06 PROCEDURE — 71045 X-RAY EXAM CHEST 1 VIEW: CPT

## 2019-11-06 PROCEDURE — 6360000002 HC RX W HCPCS: Performed by: STUDENT IN AN ORGANIZED HEALTH CARE EDUCATION/TRAINING PROGRAM

## 2019-11-06 PROCEDURE — 73060 X-RAY EXAM OF HUMERUS: CPT

## 2019-11-06 PROCEDURE — 80048 BASIC METABOLIC PNL TOTAL CA: CPT

## 2019-11-06 PROCEDURE — G8427 DOCREV CUR MEDS BY ELIG CLIN: HCPCS | Performed by: NURSE PRACTITIONER

## 2019-11-06 PROCEDURE — 76376 3D RENDER W/INTRP POSTPROCES: CPT

## 2019-11-06 PROCEDURE — 72125 CT NECK SPINE W/O DYE: CPT

## 2019-11-06 PROCEDURE — G8419 CALC BMI OUT NRM PARAM NOF/U: HCPCS | Performed by: NURSE PRACTITIONER

## 2019-11-06 PROCEDURE — 1036F TOBACCO NON-USER: CPT | Performed by: NURSE PRACTITIONER

## 2019-11-06 PROCEDURE — 84703 CHORIONIC GONADOTROPIN ASSAY: CPT

## 2019-11-06 PROCEDURE — 70450 CT HEAD/BRAIN W/O DYE: CPT

## 2019-11-06 PROCEDURE — 74178 CT ABD&PLV WO CNTR FLWD CNTR: CPT

## 2019-11-06 PROCEDURE — G8484 FLU IMMUNIZE NO ADMIN: HCPCS | Performed by: NURSE PRACTITIONER

## 2019-11-06 PROCEDURE — 85027 COMPLETE CBC AUTOMATED: CPT

## 2019-11-06 PROCEDURE — 99284 EMERGENCY DEPT VISIT MOD MDM: CPT

## 2019-11-06 PROCEDURE — 73562 X-RAY EXAM OF KNEE 3: CPT

## 2019-11-06 PROCEDURE — 6370000000 HC RX 637 (ALT 250 FOR IP): Performed by: STUDENT IN AN ORGANIZED HEALTH CARE EDUCATION/TRAINING PROGRAM

## 2019-11-06 PROCEDURE — 99213 OFFICE O/P EST LOW 20 MIN: CPT | Performed by: NURSE PRACTITIONER

## 2019-11-06 PROCEDURE — 6360000004 HC RX CONTRAST MEDICATION: Performed by: EMERGENCY MEDICINE

## 2019-11-06 RX ORDER — ACETAMINOPHEN 500 MG
1000 TABLET ORAL EVERY 6 HOURS PRN
Qty: 30 TABLET | Refills: 0 | Status: SHIPPED | OUTPATIENT
Start: 2019-11-06 | End: 2020-01-14

## 2019-11-06 RX ORDER — CYCLOBENZAPRINE HCL 10 MG
10 TABLET ORAL 3 TIMES DAILY PRN
Qty: 21 TABLET | Refills: 0 | Status: SHIPPED | OUTPATIENT
Start: 2019-11-06 | End: 2019-11-13

## 2019-11-06 RX ORDER — LIDOCAINE 50 MG/G
1 PATCH TOPICAL DAILY
Qty: 16 PATCH | Refills: 0 | Status: SHIPPED | OUTPATIENT
Start: 2019-11-06 | End: 2019-11-16

## 2019-11-06 RX ORDER — LIDOCAINE 4 G/G
2 PATCH TOPICAL ONCE
Status: DISCONTINUED | OUTPATIENT
Start: 2019-11-06 | End: 2019-11-07 | Stop reason: HOSPADM

## 2019-11-06 RX ORDER — MORPHINE SULFATE 2 MG/ML
2 INJECTION, SOLUTION INTRAMUSCULAR; INTRAVENOUS ONCE
Status: COMPLETED | OUTPATIENT
Start: 2019-11-06 | End: 2019-11-06

## 2019-11-06 RX ORDER — KETOROLAC TROMETHAMINE 30 MG/ML
15 INJECTION, SOLUTION INTRAMUSCULAR; INTRAVENOUS ONCE
Status: DISCONTINUED | OUTPATIENT
Start: 2019-11-06 | End: 2019-11-07 | Stop reason: HOSPADM

## 2019-11-06 RX ORDER — ACETAMINOPHEN 500 MG
1000 TABLET ORAL ONCE
Status: COMPLETED | OUTPATIENT
Start: 2019-11-06 | End: 2019-11-06

## 2019-11-06 RX ORDER — CYCLOBENZAPRINE HCL 10 MG
10 TABLET ORAL ONCE
Status: COMPLETED | OUTPATIENT
Start: 2019-11-06 | End: 2019-11-06

## 2019-11-06 RX ORDER — FLUCONAZOLE 150 MG/1
150 TABLET ORAL DAILY
Qty: 3 TABLET | Refills: 0 | Status: SHIPPED | OUTPATIENT
Start: 2019-11-06 | End: 2019-11-09

## 2019-11-06 RX ADMIN — CYCLOBENZAPRINE HYDROCHLORIDE 10 MG: 10 TABLET, FILM COATED ORAL at 22:44

## 2019-11-06 RX ADMIN — IOPAMIDOL 80 ML: 755 INJECTION, SOLUTION INTRAVENOUS at 20:53

## 2019-11-06 RX ADMIN — ACETAMINOPHEN 1000 MG: 500 TABLET ORAL at 22:44

## 2019-11-06 RX ADMIN — MORPHINE SULFATE 2 MG: 2 INJECTION, SOLUTION INTRAMUSCULAR; INTRAVENOUS at 20:11

## 2019-11-06 ASSESSMENT — PAIN DESCRIPTION - PAIN TYPE: TYPE: ACUTE PAIN

## 2019-11-06 ASSESSMENT — PAIN DESCRIPTION - LOCATION: LOCATION: CHEST

## 2019-11-06 ASSESSMENT — PAIN SCALES - GENERAL
PAINLEVEL_OUTOF10: 9
PAINLEVEL_OUTOF10: 9
PAINLEVEL_OUTOF10: 7

## 2019-11-07 ASSESSMENT — ENCOUNTER SYMPTOMS
SINUS PAIN: 0
CONSTIPATION: 0
COLOR CHANGE: 1
SHORTNESS OF BREATH: 0
SINUS PRESSURE: 0
ABDOMINAL PAIN: 0
BACK PAIN: 0
WHEEZING: 0
DIARRHEA: 0
COUGH: 0

## 2019-11-08 ENCOUNTER — TELEPHONE (OUTPATIENT)
Dept: INTERNAL MEDICINE CLINIC | Age: 29
End: 2019-11-08

## 2019-11-08 DIAGNOSIS — K76.9 LIVER LESION: Primary | ICD-10-CM

## 2019-11-11 ENCOUNTER — HOSPITAL ENCOUNTER (OUTPATIENT)
Dept: ULTRASOUND IMAGING | Age: 29
Discharge: HOME | End: 2019-11-11
Payer: MEDICAID

## 2019-11-11 DIAGNOSIS — K76.9 LIVER LESION: ICD-10-CM

## 2019-11-11 PROCEDURE — 76705 ECHO EXAM OF ABDOMEN: CPT

## 2019-12-10 ENCOUNTER — HOSPITAL ENCOUNTER (OUTPATIENT)
Age: 29
Discharge: HOME | End: 2019-12-10
Payer: MEDICAID

## 2019-12-10 ENCOUNTER — OFFICE VISIT (OUTPATIENT)
Dept: DERMATOLOGY | Age: 29
End: 2019-12-10
Payer: MEDICAID

## 2019-12-10 DIAGNOSIS — L70.9 ADULT ACNE: Primary | ICD-10-CM

## 2019-12-10 DIAGNOSIS — L70.9 ADULT ACNE: ICD-10-CM

## 2019-12-10 DIAGNOSIS — L85.3 XEROSIS CUTIS: ICD-10-CM

## 2019-12-10 LAB
A/G RATIO: 2.2 (ref 1.1–2.2)
ALBUMIN SERPL-MCNC: 5 G/DL (ref 3.4–5)
ALP BLD-CCNC: 66 U/L (ref 40–129)
ALT SERPL-CCNC: 13 U/L (ref 10–40)
ANION GAP SERPL CALCULATED.3IONS-SCNC: 15 MMOL/L (ref 3–16)
AST SERPL-CCNC: 20 U/L (ref 15–37)
BASOPHILS ABSOLUTE: 0 K/UL (ref 0–0.2)
BASOPHILS RELATIVE PERCENT: 1 %
BILIRUB SERPL-MCNC: 0.3 MG/DL (ref 0–1)
BUN BLDV-MCNC: 8 MG/DL (ref 7–20)
CALCIUM SERPL-MCNC: 9.2 MG/DL (ref 8.3–10.6)
CHLORIDE BLD-SCNC: 101 MMOL/L (ref 99–110)
CO2: 27 MMOL/L (ref 21–32)
CREAT SERPL-MCNC: 0.5 MG/DL (ref 0.6–1.1)
EOSINOPHILS ABSOLUTE: 0.1 K/UL (ref 0–0.6)
EOSINOPHILS RELATIVE PERCENT: 1.9 %
GFR AFRICAN AMERICAN: >60
GFR NON-AFRICAN AMERICAN: >60
GLOBULIN: 2.3 G/DL
GLUCOSE BLD-MCNC: 89 MG/DL (ref 70–99)
HCG QUALITATIVE: NEGATIVE
HCT VFR BLD CALC: 40.4 % (ref 36–48)
HEMOGLOBIN: 13.2 G/DL (ref 12–16)
LYMPHOCYTES ABSOLUTE: 1.3 K/UL (ref 1–5.1)
LYMPHOCYTES RELATIVE PERCENT: 35.3 %
MCH RBC QN AUTO: 31.4 PG (ref 26–34)
MCHC RBC AUTO-ENTMCNC: 32.7 G/DL (ref 31–36)
MCV RBC AUTO: 96 FL (ref 80–100)
MONOCYTES ABSOLUTE: 0.3 K/UL (ref 0–1.3)
MONOCYTES RELATIVE PERCENT: 6.7 %
NEUTROPHILS ABSOLUTE: 2.1 K/UL (ref 1.7–7.7)
NEUTROPHILS RELATIVE PERCENT: 55.1 %
PDW BLD-RTO: 13.5 % (ref 12.4–15.4)
PLATELET # BLD: 197 K/UL (ref 135–450)
PMV BLD AUTO: 10.2 FL (ref 5–10.5)
POTASSIUM SERPL-SCNC: 3.8 MMOL/L (ref 3.5–5.1)
RBC # BLD: 4.21 M/UL (ref 4–5.2)
SODIUM BLD-SCNC: 143 MMOL/L (ref 136–145)
TOTAL PROTEIN: 7.3 G/DL (ref 6.4–8.2)
WBC # BLD: 3.8 K/UL (ref 4–11)

## 2019-12-10 PROCEDURE — 1036F TOBACCO NON-USER: CPT | Performed by: DERMATOLOGY

## 2019-12-10 PROCEDURE — 84703 CHORIONIC GONADOTROPIN ASSAY: CPT

## 2019-12-10 PROCEDURE — G8427 DOCREV CUR MEDS BY ELIG CLIN: HCPCS | Performed by: DERMATOLOGY

## 2019-12-10 PROCEDURE — 85025 COMPLETE CBC W/AUTO DIFF WBC: CPT

## 2019-12-10 PROCEDURE — 36415 COLL VENOUS BLD VENIPUNCTURE: CPT

## 2019-12-10 PROCEDURE — G8484 FLU IMMUNIZE NO ADMIN: HCPCS | Performed by: DERMATOLOGY

## 2019-12-10 PROCEDURE — G8419 CALC BMI OUT NRM PARAM NOF/U: HCPCS | Performed by: DERMATOLOGY

## 2019-12-10 PROCEDURE — 99213 OFFICE O/P EST LOW 20 MIN: CPT | Performed by: DERMATOLOGY

## 2019-12-10 PROCEDURE — 80053 COMPREHEN METABOLIC PANEL: CPT

## 2019-12-10 RX ORDER — IBUPROFEN 800 MG/1
TABLET ORAL
COMMUNITY
Start: 2019-11-13 | End: 2020-01-14

## 2019-12-10 RX ORDER — CYCLOBENZAPRINE HCL 10 MG
10 TABLET ORAL 3 TIMES DAILY PRN
COMMUNITY
Start: 2019-11-13 | End: 2020-02-20 | Stop reason: ALTCHOICE

## 2019-12-10 RX ORDER — DAPSONE 75 MG/G
GEL TOPICAL
Qty: 90 G | Refills: 1 | Status: SHIPPED | OUTPATIENT
Start: 2019-12-10 | End: 2020-12-04 | Stop reason: ALTCHOICE

## 2019-12-12 ENCOUNTER — TELEPHONE (OUTPATIENT)
Dept: DERMATOLOGY | Age: 29
End: 2019-12-12

## 2019-12-12 RX ORDER — SPIRONOLACTONE 25 MG/1
25 TABLET ORAL DAILY
Qty: 30 TABLET | Refills: 0 | Status: SHIPPED | OUTPATIENT
Start: 2019-12-12 | End: 2020-01-16 | Stop reason: SDUPTHER

## 2019-12-16 ENCOUNTER — PATIENT MESSAGE (OUTPATIENT)
Dept: INTERNAL MEDICINE CLINIC | Age: 29
End: 2019-12-16

## 2019-12-17 ENCOUNTER — OFFICE VISIT (OUTPATIENT)
Dept: INTERNAL MEDICINE CLINIC | Age: 29
End: 2019-12-17
Payer: MEDICAID

## 2019-12-17 VITALS — SYSTOLIC BLOOD PRESSURE: 104 MMHG | HEART RATE: 72 BPM | DIASTOLIC BLOOD PRESSURE: 58 MMHG

## 2019-12-17 DIAGNOSIS — N30.01 ACUTE CYSTITIS WITH HEMATURIA: ICD-10-CM

## 2019-12-17 DIAGNOSIS — R35.0 URINE FREQUENCY: Primary | ICD-10-CM

## 2019-12-17 LAB
BILIRUBIN, POC: NORMAL
BLOOD URINE, POC: NORMAL
CLARITY, POC: NORMAL
COLOR, POC: NORMAL
GLUCOSE URINE, POC: NORMAL
KETONES, POC: NORMAL
LEUKOCYTE EST, POC: NORMAL
NITRITE, POC: NORMAL
PH, POC: 5
PROTEIN, POC: NORMAL
SPECIFIC GRAVITY, POC: 1
UROBILINOGEN, POC: 0.2

## 2019-12-17 PROCEDURE — 81002 URINALYSIS NONAUTO W/O SCOPE: CPT | Performed by: NURSE PRACTITIONER

## 2019-12-17 PROCEDURE — 99213 OFFICE O/P EST LOW 20 MIN: CPT | Performed by: NURSE PRACTITIONER

## 2019-12-17 PROCEDURE — G8419 CALC BMI OUT NRM PARAM NOF/U: HCPCS | Performed by: NURSE PRACTITIONER

## 2019-12-17 PROCEDURE — G8484 FLU IMMUNIZE NO ADMIN: HCPCS | Performed by: NURSE PRACTITIONER

## 2019-12-17 PROCEDURE — G8428 CUR MEDS NOT DOCUMENT: HCPCS | Performed by: NURSE PRACTITIONER

## 2019-12-17 PROCEDURE — 1036F TOBACCO NON-USER: CPT | Performed by: NURSE PRACTITIONER

## 2019-12-17 RX ORDER — NITROFURANTOIN 25; 75 MG/1; MG/1
100 CAPSULE ORAL 2 TIMES DAILY
Qty: 10 CAPSULE | Refills: 0 | Status: SHIPPED | OUTPATIENT
Start: 2019-12-17 | End: 2019-12-22

## 2019-12-17 ASSESSMENT — ENCOUNTER SYMPTOMS
ABDOMINAL PAIN: 0
COLOR CHANGE: 0
COUGH: 0
SINUS PAIN: 0
BACK PAIN: 0
NAUSEA: 0
CONSTIPATION: 0
VOMITING: 0
WHEEZING: 0
SHORTNESS OF BREATH: 0
SINUS PRESSURE: 0
DIARRHEA: 0

## 2019-12-20 LAB
ORGANISM: ABNORMAL
URINE CULTURE, ROUTINE: ABNORMAL

## 2019-12-20 RX ORDER — CIPROFLOXACIN 500 MG/1
500 TABLET, FILM COATED ORAL 2 TIMES DAILY
Qty: 10 TABLET | Refills: 0 | Status: SHIPPED | OUTPATIENT
Start: 2019-12-20 | End: 2019-12-25

## 2019-12-27 DIAGNOSIS — R30.0 DYSURIA: ICD-10-CM

## 2019-12-27 DIAGNOSIS — N30.01 ACUTE CYSTITIS WITH HEMATURIA: Primary | ICD-10-CM

## 2019-12-27 PROCEDURE — 81003 URINALYSIS AUTO W/O SCOPE: CPT | Performed by: NURSE PRACTITIONER

## 2019-12-28 LAB
BACTERIA: ABNORMAL /HPF
BILIRUBIN URINE: NEGATIVE
BLOOD, URINE: NEGATIVE
CLARITY: ABNORMAL
COLOR: YELLOW
EPITHELIAL CELLS, UA: 9 /HPF (ref 0–5)
GLUCOSE URINE: NEGATIVE MG/DL
KETONES, URINE: NEGATIVE MG/DL
LEUKOCYTE ESTERASE, URINE: NEGATIVE
MICROSCOPIC EXAMINATION: YES
NITRITE, URINE: NEGATIVE
PH UA: 6 (ref 5–8)
PROTEIN UA: ABNORMAL MG/DL
RBC UA: 3 /HPF (ref 0–4)
SPECIFIC GRAVITY UA: 1.03 (ref 1–1.03)
URINE REFLEX TO CULTURE: YES
URINE TYPE: ABNORMAL
UROBILINOGEN, URINE: 0.2 E.U./DL
WBC UA: 47 /HPF (ref 0–5)

## 2019-12-30 LAB — URINE CULTURE, ROUTINE: NORMAL

## 2020-01-16 RX ORDER — SPIRONOLACTONE 25 MG/1
25 TABLET ORAL DAILY
Qty: 30 TABLET | Refills: 0 | Status: ON HOLD | OUTPATIENT
Start: 2020-01-16 | End: 2020-02-21

## 2020-01-17 ENCOUNTER — TELEPHONE (OUTPATIENT)
Dept: DERMATOLOGY | Age: 30
End: 2020-01-17

## 2020-02-11 ENCOUNTER — OFFICE VISIT (OUTPATIENT)
Dept: GYNECOLOGY | Age: 30
End: 2020-02-11
Payer: MEDICAID

## 2020-02-11 VITALS
HEART RATE: 64 BPM | WEIGHT: 110.5 LBS | DIASTOLIC BLOOD PRESSURE: 60 MMHG | BODY MASS INDEX: 17.76 KG/M2 | HEIGHT: 66 IN | SYSTOLIC BLOOD PRESSURE: 100 MMHG | RESPIRATION RATE: 17 BRPM

## 2020-02-11 PROCEDURE — G8484 FLU IMMUNIZE NO ADMIN: HCPCS | Performed by: OBSTETRICS & GYNECOLOGY

## 2020-02-11 PROCEDURE — 99213 OFFICE O/P EST LOW 20 MIN: CPT | Performed by: OBSTETRICS & GYNECOLOGY

## 2020-02-11 PROCEDURE — G8419 CALC BMI OUT NRM PARAM NOF/U: HCPCS | Performed by: OBSTETRICS & GYNECOLOGY

## 2020-02-11 PROCEDURE — 1036F TOBACCO NON-USER: CPT | Performed by: OBSTETRICS & GYNECOLOGY

## 2020-02-11 PROCEDURE — G8428 CUR MEDS NOT DOCUMENT: HCPCS | Performed by: OBSTETRICS & GYNECOLOGY

## 2020-02-11 RX ORDER — DIAPHRAGMS, CONTOURED 65 MM-80MM
1 DIAPHRAGM VAGINAL DAILY PRN
Qty: 1 EACH | Refills: 1 | Status: SHIPPED | OUTPATIENT
Start: 2020-02-11 | End: 2020-11-12

## 2020-02-14 ENCOUNTER — TELEPHONE (OUTPATIENT)
Dept: GYNECOLOGY | Age: 30
End: 2020-02-14

## 2020-02-14 NOTE — TELEPHONE ENCOUNTER
Dr. Maria Isabel Beauchamp was to put in a referral for [facility], needs to be faxed to [phone], patient can be reached at [phone]

## 2020-02-16 ASSESSMENT — ENCOUNTER SYMPTOMS
EYES NEGATIVE: 1
GASTROINTESTINAL NEGATIVE: 1
RESPIRATORY NEGATIVE: 1

## 2020-02-16 NOTE — PROGRESS NOTES
[address] [facility] [address]  Dept: [phone]  Dept Fax: [phone]  Loc: [phone]     2020    Azalea Holt (:  1990) is a 34 y.o. female, here for evaluation of the following medical concerns:    Patient is here to discuss birth control. Patient with hx TIA, complex regional pain syndrome. Patient also considering pregnancy. Review of Systems   Constitutional: Negative. HENT: Negative. Eyes: Negative. Respiratory: Negative. Cardiovascular: Negative. Gastrointestinal: Negative. Genitourinary: Negative. Musculoskeletal: Positive for myalgias. Skin: Negative. Neurological: Negative. Psychiatric/Behavioral: Negative. Prior to Visit Medications    Medication Sig Taking? Authorizing Provider   [facility] 1 Device vaginally daily as needed (birth control) Yes Neetu Solomon MD   spironolactone (ALDACTONE) 25 MG tablet Take 1 tablet by mouth daily  Camila Pass, DO   cyclobenzaprine (FLEXERIL) 10 MG tablet Take 10 mg by mouth 3 times daily as needed   Historical Provider, MD   Dapsone (ACZONE) 7.5 % GEL Apply thin layer to affected areas on face, neck, chest every morning.   Camila Pass, DO   nystatin (MYCOSTATIN) 545008 UNIT/GM cream Apply Twice daily for 1 week to corners of mouth, may use twice daily as needed for flares  Camila Pass, DO   Multiple Vitamins-Minerals (THERAPEUTIC MULTIVITAMIN-MINERALS) tablet Take 1 tablet by mouth daily  Historical Provider, MD   iron polysaccharides (FERREX 150) 150 MG capsule Take 1 capsule by mouth 2 times daily  Raimundo Self, APRN - CNP   Fe-Succ Ac-C-Thre Ac (NIFEREX) 150-50-50 MG CAPS Take 1 capsule by mouth 2 times daily  Eusebia Cardenas MD   amitriptyline (ELAVIL) 25 MG tablet Take 1 tablet by mouth nightly  Eusebia Cardenas MD        Allergies   Allergen Reactions    Phenergan [Promethazine] abused: Not on file     Physically abused: Not on file     Forced sexual activity: Not on file   Other Topics Concern    Not on file   Social History Narrative    Exercise: swim, running    Seatbelt use: yes    Caffeine intake: one cup coffee daily                    No family history on file. Vitals:    02/11/20 1541   BP: 100/60   Site: Right Upper Arm   Position: Sitting   Cuff Size: Medium Adult   Pulse: 64   Resp: 17   Weight: 110 lb 8 oz (50.1 kg)   Height: 5' 6\" (1.676 m)     Estimated body mass index is 17.84 kg/m² as calculated from the following:    Height as of this encounter: 5' 6\" (1.676 m). Weight as of this encounter: 110 lb 8 oz (50.1 kg). Physical Exam  Constitutional:       Appearance: Normal appearance. She is normal weight. HENT:      Head: Normocephalic. Nose: Nose normal.      Mouth/Throat:      Mouth: Mucous membranes are moist.      Pharynx: Oropharynx is clear. Neck:      Musculoskeletal: Normal range of motion. Musculoskeletal: Normal range of motion. Skin:     General: Skin is warm and dry. Neurological:      General: No focal deficit present. Mental Status: She is alert and oriented to person, place, and time. Mental status is at baseline. Psychiatric:         Mood and Affect: Mood normal.         Behavior: Behavior normal.         Thought Content: Thought content normal.         Judgment: Judgment normal.         ASSESSMENT/PLAN:  1. Hx TIA  2.  Complex regional pain syndrome  For Birth control-feel could use diaphragm-CAYA discussed with patient  -for pre-conception planning    Greater than 50% of time spent on counseling

## 2020-02-19 ENCOUNTER — TELEPHONE (OUTPATIENT)
Dept: INTERNAL MEDICINE CLINIC | Age: 30
End: 2020-02-19

## 2020-02-19 NOTE — TELEPHONE ENCOUNTER
Symptoms of headaches, dizziness, sore throat, cough and fever started Thursday. Tested positive for the flu A on Saturday at Urgent Care and started Tamiflu immediately Saturday. Scheduled for surgery this Friday Her surgeon says its okay to continue with her surgery, she wants your opinion on following through with surgery being that she has the flu.
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
## 2020-02-20 ENCOUNTER — ANESTHESIA EVENT (OUTPATIENT)
Dept: OPERATING ROOM | Age: 30
End: 2020-02-20
Payer: MEDICAID

## 2020-02-20 RX ORDER — OXYCODONE HYDROCHLORIDE AND ACETAMINOPHEN 5; 325 MG/1; MG/1
1 TABLET ORAL 3 TIMES DAILY
COMMUNITY
End: 2020-03-05

## 2020-02-21 ENCOUNTER — APPOINTMENT (OUTPATIENT)
Dept: GENERAL RADIOLOGY | Age: 30
End: 2020-02-21
Attending: ANESTHESIOLOGY
Payer: MEDICAID

## 2020-02-21 ENCOUNTER — HOSPITAL ENCOUNTER (OUTPATIENT)
Age: 30
Setting detail: OUTPATIENT SURGERY
Discharge: HOME | End: 2020-02-21
Attending: ANESTHESIOLOGY | Admitting: ANESTHESIOLOGY
Payer: MEDICAID

## 2020-02-21 ENCOUNTER — ANESTHESIA (OUTPATIENT)
Dept: OPERATING ROOM | Age: 30
End: 2020-02-21
Payer: MEDICAID

## 2020-02-21 VITALS
DIASTOLIC BLOOD PRESSURE: 54 MMHG | SYSTOLIC BLOOD PRESSURE: 98 MMHG | TEMPERATURE: 97.5 F | OXYGEN SATURATION: 100 % | RESPIRATION RATE: 12 BRPM

## 2020-02-21 VITALS
BODY MASS INDEX: 16.73 KG/M2 | HEIGHT: 66 IN | HEART RATE: 88 BPM | SYSTOLIC BLOOD PRESSURE: 105 MMHG | RESPIRATION RATE: 16 BRPM | WEIGHT: 104.13 LBS | TEMPERATURE: 97.8 F | OXYGEN SATURATION: 100 % | DIASTOLIC BLOOD PRESSURE: 65 MMHG

## 2020-02-21 LAB — HCG(URINE) PREGNANCY TEST: NEGATIVE

## 2020-02-21 PROCEDURE — 3600000012 HC SURGERY LEVEL 2 ADDTL 15MIN: Performed by: ANESTHESIOLOGY

## 2020-02-21 PROCEDURE — 2709999900 HC NON-CHARGEABLE SUPPLY: Performed by: ANESTHESIOLOGY

## 2020-02-21 PROCEDURE — 2500000003 HC RX 250 WO HCPCS: Performed by: ANESTHESIOLOGY

## 2020-02-21 PROCEDURE — 6360000002 HC RX W HCPCS: Performed by: NURSE ANESTHETIST, CERTIFIED REGISTERED

## 2020-02-21 PROCEDURE — 3600000002 HC SURGERY LEVEL 2 BASE: Performed by: ANESTHESIOLOGY

## 2020-02-21 PROCEDURE — 7100000010 HC PHASE II RECOVERY - FIRST 15 MIN: Performed by: ANESTHESIOLOGY

## 2020-02-21 PROCEDURE — 3700000000 HC ANESTHESIA ATTENDED CARE: Performed by: ANESTHESIOLOGY

## 2020-02-21 PROCEDURE — 6370000000 HC RX 637 (ALT 250 FOR IP): Performed by: ANESTHESIOLOGY

## 2020-02-21 PROCEDURE — 2580000003 HC RX 258: Performed by: ANESTHESIOLOGY

## 2020-02-21 PROCEDURE — 7100000011 HC PHASE II RECOVERY - ADDTL 15 MIN: Performed by: ANESTHESIOLOGY

## 2020-02-21 PROCEDURE — 2780000010 HC IMPLANT OTHER: Performed by: ANESTHESIOLOGY

## 2020-02-21 PROCEDURE — 84703 CHORIONIC GONADOTROPIN ASSAY: CPT

## 2020-02-21 PROCEDURE — 6370000000 HC RX 637 (ALT 250 FOR IP)

## 2020-02-21 PROCEDURE — 3209999900 FLUORO FOR SURGICAL PROCEDURES

## 2020-02-21 PROCEDURE — 6360000002 HC RX W HCPCS: Performed by: ANESTHESIOLOGY

## 2020-02-21 PROCEDURE — 7100000001 HC PACU RECOVERY - ADDTL 15 MIN: Performed by: ANESTHESIOLOGY

## 2020-02-21 PROCEDURE — 2720000010 HC SURG SUPPLY STERILE: Performed by: ANESTHESIOLOGY

## 2020-02-21 PROCEDURE — 2580000003 HC RX 258: Performed by: NURSE ANESTHETIST, CERTIFIED REGISTERED

## 2020-02-21 PROCEDURE — C1778 LEAD, NEUROSTIMULATOR: HCPCS | Performed by: ANESTHESIOLOGY

## 2020-02-21 PROCEDURE — 3700000001 HC ADD 15 MINUTES (ANESTHESIA): Performed by: ANESTHESIOLOGY

## 2020-02-21 PROCEDURE — 2500000003 HC RX 250 WO HCPCS: Performed by: NURSE ANESTHETIST, CERTIFIED REGISTERED

## 2020-02-21 PROCEDURE — 7100000000 HC PACU RECOVERY - FIRST 15 MIN: Performed by: ANESTHESIOLOGY

## 2020-02-21 DEVICE — 70CM 8 CONTACT LEAD KIT
Type: IMPLANTABLE DEVICE | Site: BACK | Status: FUNCTIONAL
Brand: LINEAR™ ST

## 2020-02-21 DEVICE — ANCHOR
Type: IMPLANTABLE DEVICE | Site: BACK | Status: FUNCTIONAL
Brand: CLIK™ X

## 2020-02-21 DEVICE — 50CM 8 CONTACT LEAD KIT
Type: IMPLANTABLE DEVICE | Site: BACK | Status: FUNCTIONAL
Brand: LINEAR™ ST

## 2020-02-21 DEVICE — IMPLANTABLE DEVICE: Type: IMPLANTABLE DEVICE | Site: BACK | Status: FUNCTIONAL

## 2020-02-21 RX ORDER — DEXAMETHASONE SODIUM PHOSPHATE 4 MG/ML
INJECTION, SOLUTION INTRA-ARTICULAR; INTRALESIONAL; INTRAMUSCULAR; INTRAVENOUS; SOFT TISSUE PRN
Status: DISCONTINUED | OUTPATIENT
Start: 2020-02-21 | End: 2020-02-21 | Stop reason: SDUPTHER

## 2020-02-21 RX ORDER — FENTANYL CITRATE 50 UG/ML
25 INJECTION, SOLUTION INTRAMUSCULAR; INTRAVENOUS EVERY 5 MIN PRN
Status: DISCONTINUED | OUTPATIENT
Start: 2020-02-21 | End: 2020-02-21 | Stop reason: HOSPADM

## 2020-02-21 RX ORDER — SODIUM CHLORIDE 9 MG/ML
INJECTION, SOLUTION INTRAVENOUS CONTINUOUS
Status: DISCONTINUED | OUTPATIENT
Start: 2020-02-21 | End: 2020-02-21 | Stop reason: HOSPADM

## 2020-02-21 RX ORDER — OXYCODONE HYDROCHLORIDE AND ACETAMINOPHEN 5; 325 MG/1; MG/1
1 TABLET ORAL ONCE
Status: COMPLETED | OUTPATIENT
Start: 2020-02-21 | End: 2020-02-21

## 2020-02-21 RX ORDER — ESMOLOL HYDROCHLORIDE 10 MG/ML
INJECTION INTRAVENOUS PRN
Status: DISCONTINUED | OUTPATIENT
Start: 2020-02-21 | End: 2020-02-21 | Stop reason: SDUPTHER

## 2020-02-21 RX ORDER — CYCLOBENZAPRINE HCL 10 MG
10 TABLET ORAL 3 TIMES DAILY PRN
COMMUNITY
End: 2020-11-23

## 2020-02-21 RX ORDER — LABETALOL HYDROCHLORIDE 5 MG/ML
5 INJECTION, SOLUTION INTRAVENOUS EVERY 10 MIN PRN
Status: DISCONTINUED | OUTPATIENT
Start: 2020-02-21 | End: 2020-02-21 | Stop reason: HOSPADM

## 2020-02-21 RX ORDER — PROPOFOL 10 MG/ML
INJECTION, EMULSION INTRAVENOUS CONTINUOUS PRN
Status: DISCONTINUED | OUTPATIENT
Start: 2020-02-21 | End: 2020-02-21 | Stop reason: SDUPTHER

## 2020-02-21 RX ORDER — KETAMINE HCL IN NACL, ISO-OSM 100MG/10ML
SYRINGE (ML) INJECTION PRN
Status: DISCONTINUED | OUTPATIENT
Start: 2020-02-21 | End: 2020-02-21 | Stop reason: SDUPTHER

## 2020-02-21 RX ORDER — OXYCODONE HYDROCHLORIDE AND ACETAMINOPHEN 5; 325 MG/1; MG/1
TABLET ORAL
Status: COMPLETED
Start: 2020-02-21 | End: 2020-02-21

## 2020-02-21 RX ORDER — ONDANSETRON 2 MG/ML
INJECTION INTRAMUSCULAR; INTRAVENOUS PRN
Status: DISCONTINUED | OUTPATIENT
Start: 2020-02-21 | End: 2020-02-21 | Stop reason: SDUPTHER

## 2020-02-21 RX ORDER — APREPITANT 40 MG/1
40 CAPSULE ORAL ONCE
Status: COMPLETED | OUTPATIENT
Start: 2020-02-21 | End: 2020-02-21

## 2020-02-21 RX ORDER — CEFAZOLIN SODIUM 1 G/3ML
INJECTION, POWDER, FOR SOLUTION INTRAMUSCULAR; INTRAVENOUS PRN
Status: DISCONTINUED | OUTPATIENT
Start: 2020-02-21 | End: 2020-02-21 | Stop reason: SDUPTHER

## 2020-02-21 RX ORDER — VANCOMYCIN HYDROCHLORIDE 1 G/20ML
INJECTION, POWDER, LYOPHILIZED, FOR SOLUTION INTRAVENOUS
Status: COMPLETED | OUTPATIENT
Start: 2020-02-21 | End: 2020-02-21

## 2020-02-21 RX ORDER — SODIUM CHLORIDE 0.9 % (FLUSH) 0.9 %
10 SYRINGE (ML) INJECTION EVERY 12 HOURS SCHEDULED
Status: DISCONTINUED | OUTPATIENT
Start: 2020-02-21 | End: 2020-02-21 | Stop reason: HOSPADM

## 2020-02-21 RX ORDER — SCOLOPAMINE TRANSDERMAL SYSTEM 1 MG/1
1 PATCH, EXTENDED RELEASE TRANSDERMAL ONCE
Status: DISCONTINUED | OUTPATIENT
Start: 2020-02-21 | End: 2020-02-21 | Stop reason: HOSPADM

## 2020-02-21 RX ORDER — SODIUM CHLORIDE 0.9 % (FLUSH) 0.9 %
10 SYRINGE (ML) INJECTION PRN
Status: DISCONTINUED | OUTPATIENT
Start: 2020-02-21 | End: 2020-02-21 | Stop reason: HOSPADM

## 2020-02-21 RX ORDER — FENTANYL CITRATE 50 UG/ML
INJECTION, SOLUTION INTRAMUSCULAR; INTRAVENOUS PRN
Status: DISCONTINUED | OUTPATIENT
Start: 2020-02-21 | End: 2020-02-21 | Stop reason: SDUPTHER

## 2020-02-21 RX ORDER — HYDROMORPHONE HCL 110MG/55ML
0.5 PATIENT CONTROLLED ANALGESIA SYRINGE INTRAVENOUS EVERY 5 MIN PRN
Status: DISCONTINUED | OUTPATIENT
Start: 2020-02-21 | End: 2020-02-21 | Stop reason: HOSPADM

## 2020-02-21 RX ORDER — MIDAZOLAM HYDROCHLORIDE 1 MG/ML
INJECTION INTRAMUSCULAR; INTRAVENOUS PRN
Status: DISCONTINUED | OUTPATIENT
Start: 2020-02-21 | End: 2020-02-21 | Stop reason: SDUPTHER

## 2020-02-21 RX ORDER — LIDOCAINE HYDROCHLORIDE 20 MG/ML
INJECTION, SOLUTION EPIDURAL; INFILTRATION; INTRACAUDAL; PERINEURAL PRN
Status: DISCONTINUED | OUTPATIENT
Start: 2020-02-21 | End: 2020-02-21 | Stop reason: SDUPTHER

## 2020-02-21 RX ADMIN — ESMOLOL HYDROCHLORIDE 10 MG: 10 INJECTION, SOLUTION INTRAVENOUS at 08:01

## 2020-02-21 RX ADMIN — APREPITANT 40 MG: 40 CAPSULE ORAL at 07:01

## 2020-02-21 RX ADMIN — WATER 10 ML: 1 INJECTION INTRAMUSCULAR; INTRAVENOUS; SUBCUTANEOUS at 09:38

## 2020-02-21 RX ADMIN — FENTANYL CITRATE 25 MCG: 50 INJECTION, SOLUTION INTRAMUSCULAR; INTRAVENOUS at 08:02

## 2020-02-21 RX ADMIN — MIDAZOLAM 2 MG: 1 INJECTION INTRAMUSCULAR; INTRAVENOUS at 07:37

## 2020-02-21 RX ADMIN — PROPOFOL 50 MCG/KG/MIN: 10 INJECTION, EMULSION INTRAVENOUS at 07:49

## 2020-02-21 RX ADMIN — ESMOLOL HYDROCHLORIDE 10 MG: 10 INJECTION, SOLUTION INTRAVENOUS at 09:01

## 2020-02-21 RX ADMIN — SODIUM CHLORIDE: 9 INJECTION, SOLUTION INTRAVENOUS at 06:51

## 2020-02-21 RX ADMIN — DEXAMETHASONE SODIUM PHOSPHATE 8 MG: 4 INJECTION, SOLUTION INTRAMUSCULAR; INTRAVENOUS at 09:44

## 2020-02-21 RX ADMIN — Medication 10 MG: at 08:56

## 2020-02-21 RX ADMIN — OXYCODONE HYDROCHLORIDE AND ACETAMINOPHEN 1 TABLET: 5; 325 TABLET ORAL at 11:37

## 2020-02-21 RX ADMIN — FENTANYL CITRATE 25 MCG: 50 INJECTION, SOLUTION INTRAMUSCULAR; INTRAVENOUS at 08:41

## 2020-02-21 RX ADMIN — CEFAZOLIN 2 G: 10 INJECTION, POWDER, FOR SOLUTION INTRAVENOUS at 07:30

## 2020-02-21 RX ADMIN — ONDANSETRON 4 MG: 2 INJECTION INTRAMUSCULAR; INTRAVENOUS at 10:30

## 2020-02-21 RX ADMIN — LIDOCAINE HYDROCHLORIDE 100 MG: 20 INJECTION, SOLUTION EPIDURAL; INFILTRATION; INTRACAUDAL; PERINEURAL at 07:49

## 2020-02-21 RX ADMIN — CEFAZOLIN 1 MG: 1 INJECTION, POWDER, FOR SOLUTION INTRAVENOUS at 09:38

## 2020-02-21 RX ADMIN — FENTANYL CITRATE 25 MCG: 50 INJECTION, SOLUTION INTRAMUSCULAR; INTRAVENOUS at 07:50

## 2020-02-21 RX ADMIN — ESMOLOL HYDROCHLORIDE 10 MG: 10 INJECTION, SOLUTION INTRAVENOUS at 07:50

## 2020-02-21 ASSESSMENT — PULMONARY FUNCTION TESTS
PIF_VALUE: 0
PIF_VALUE: 2
PIF_VALUE: 0

## 2020-02-21 ASSESSMENT — PAIN SCALES - GENERAL
PAINLEVEL_OUTOF10: 3
PAINLEVEL_OUTOF10: 5
PAINLEVEL_OUTOF10: 3

## 2020-02-21 ASSESSMENT — PAIN DESCRIPTION - DESCRIPTORS
DESCRIPTORS: DISCOMFORT
DESCRIPTORS: BURNING

## 2020-02-21 ASSESSMENT — PAIN DESCRIPTION - LOCATION: LOCATION: BACK

## 2020-02-21 ASSESSMENT — PAIN - FUNCTIONAL ASSESSMENT: PAIN_FUNCTIONAL_ASSESSMENT: 0-10

## 2020-02-21 ASSESSMENT — PAIN DESCRIPTION - PAIN TYPE: TYPE: SURGICAL PAIN

## 2020-02-21 NOTE — ANESTHESIA POSTPROCEDURE EVALUATION
[facility] Department of Anesthesiology  Post-Anesthesia Note       Name:  Elida Garcia                                  Age:  34 y.o. MRN:  [id number]     Last Vitals & Oxygen Saturation: /80   Pulse 77   Temp 97.4 °F (36.3 °C) (Temporal)   Resp 16   Ht 5' 6\" (1.676 m)   Wt 104 lb 2 oz (47.2 kg)   LMP 02/05/2020   SpO2 100%   BMI 16.81 kg/m²   Patient Vitals for the past 4 hrs:   BP Temp Temp src Pulse Resp SpO2   02/21/20 1130 108/80 -- -- 77 -- 100 %   02/21/20 1115 91/77 -- -- 92 16 99 %   02/21/20 1100 103/75 -- -- 77 16 100 %   02/21/20 1045 95/62 -- -- 72 15 100 %   02/21/20 1040 (!) 93/59 97.4 °F (36.3 °C) Temporal 78 15 --   02/21/20 1035 (!) 89/56 -- -- 88 15 100 %   02/21/20 1032 (!) 97/56 97.6 °F (36.4 °C) Temporal 86 15 100 %       Level of consciousness:  Awake, alert    Respiratory: Respirations easy, no distress. Stable. Cardiovascular: Hemodynamically stable. Hydration: Adequate. PONV: Adequately managed. Post-op pain: Adequately controlled. Post-op assessment: Tolerated anesthetic well without complication. Complications:  None.     Phylicia Vizcaino MD  February 21, 2020   11:35 AM
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
## 2020-02-21 NOTE — PROGRESS NOTES
Pt moved to phase II. Fiance at bedside. Room air. Pt alert and oriented. Rep at bedside. Vss. Pt stable.

## 2020-02-21 NOTE — PROGRESS NOTES
Patient education given  and the patient expresses understanding and acceptance of instructions. Sandee Lopez 2/21/2020 10:59 AM  Discharge instructions reviewed with patient/responsible adult. All home medications have been reviewed, questions answered and patient verbalized understanding. Discharge instructions signed and copies given.

## 2020-02-21 NOTE — ANESTHESIA PRE PROCEDURE
[facility] Department of Anesthesiology  Pre-Anesthesia Evaluation/Consultation       Name:  Gemini Love  : 1990  Age:  34 y.o. MRN:  [id number]  Date: 2020           Surgeon: Surgeon(s):  Kim Palomo MD    Procedure: Procedure(s):  SPINAL CORD NERVE STIMULATOR IMPLANT (ADDITION OF 2 CERVICAL LEADS) - [employer]     Allergies   Allergen Reactions    Phenergan [Promethazine]      Pt mother had a really bad reaction and was hospitalized pt has always been told to say she could not have it    Baclofen      tremors    Mobic [Meloxicam] Nausea And Vomiting     edema    Nitrous Oxide Nausea And Vomiting     Patient Active Problem List   Diagnosis    Iron deficiency anemia    Complex regional pain syndrome I    Acute nonintractable headache    Ocular migraine    CRPS (complex regional pain syndrome type I)    Chronic fatigue    Left breast mass    Inversion of both nipples    Cellulitis of toe of left foot    Vaginal yeast infection    Acute cystitis with hematuria     Past Medical History:   Diagnosis Date    Cerebral palsy (HCC)     mild at birth   [facility] Complex regional pain syndrome     Iron deficiency anemia     Kidney stone     PONV (postoperative nausea and vomiting)     TIA (transient ischemic attack)     possible      Past Surgical History:   Procedure Laterality Date    BREAST SURGERY Left 2019    LEFT BREAST EXCISIONAL BIOPSY performed by Edu Meredith MD at [address]      Spinal stimulator    WISDOM TOOTH EXTRACTION       Social History     Tobacco Use    Smoking status: Never Smoker    Smokeless tobacco: Never Used   Substance Use Topics    Alcohol use: Never     Frequency: Never    Drug use: No     Medications  No current facility-administered medications on file prior to encounter.       Current Outpatient Medications on File Prior to Encounter   Medication Sig Dispense Refill    cyclobenzaprine (FLEXERIL) 10 MG tablet Take 10 mg by mouth 3 times daily as needed for Muscle spasms      oxyCODONE-acetaminophen (PERCOCET) 5-325 MG per tablet Take 1 tablet by mouth 3 times daily.  Dapsone (ACZONE) 7.5 % GEL Apply thin layer to affected areas on face, neck, chest every morning.  90 g 1    Multiple Vitamins-Minerals (THERAPEUTIC MULTIVITAMIN-MINERALS) tablet Take 1 tablet by mouth daily      iron polysaccharides (FERREX 150) 150 MG capsule Take 1 capsule by mouth 2 times daily 60 capsule 3    amitriptyline (ELAVIL) 25 MG tablet Take 1 tablet by mouth nightly 30 tablet 0    nystatin (MYCOSTATIN) 999626 UNIT/GM cream Apply Twice daily for 1 week to corners of mouth, may use twice daily as needed for flares 30 g 2     Current Facility-Administered Medications   Medication Dose Route Frequency Provider Last Rate Last Dose    0.9 % sodium chloride infusion   Intravenous Continuous Jeffrey Grimaldo  mL/hr at 20 0651      ceFAZolin (ANCEF) 2 g in dextrose 5 % 100 mL IVPB  2 g Intravenous Once Jeffrey Grimaldo MD         Vital Signs (Current)   Vitals:    2030   BP: (!) 92/59   Pulse: 80   Resp: 16   Temp: 97.8 °F (36.6 °C)   SpO2: 100%     Vital Signs Statistics (for past 48 hrs)     Temp  Av.8 °F (36.6 °C)  Min: 97.8 °F (36.6 °C)   Min taken time: 20  Max: 97.8 °F (36.6 °C)   Max taken time: 2030  Pulse  Av  Min: [de-identified]   Min taken time: 2030  Max: [de-identified]   Max taken time: 20 0630  Resp  Av  Min: 12   Min taken time: 2030  Max: 12   Max taken time: 2030  BP  Min: 92/59   Min taken time: 2030  Max: 92/59   Max taken time: 2030  SpO2  Av %  Min: 100 %   Min taken time: 20  Max: 100 %   Max taken time: 20 0630    BP Readings from Last 3 Encounters:   20 (!) 92/59   20 100/60   19 (!) 104/58     BMI  Body mass index is 16.81 kg/m². Estimated body mass index is 16.81 kg/m² as calculated from the following:    Height as of this encounter: 5' 6\" (1.676 m). Weight as of this encounter: 104 lb 2 oz (47.2 kg). CBC   Lab Results   Component Value Date    WBC 3.8 12/10/2019    RBC 4.21 12/10/2019    HGB 13.2 12/10/2019    HCT 40.4 12/10/2019    MCV 96.0 12/10/2019    RDW 13.5 12/10/2019     12/10/2019     CMP    Lab Results   Component Value Date     01/14/2020    K 4.4 01/14/2020    K 4.1 11/06/2019     01/14/2020    CO2 28 01/14/2020    BUN 14 01/14/2020    CREATININE 0.6 01/14/2020    GFRAA >60 01/14/2020    AGRATIO 2.2 12/10/2019    LABGLOM >60 01/14/2020    LABGLOM 105 04/29/2014    GLUCOSE 94 01/14/2020    PROT 7.3 12/10/2019    CALCIUM 9.2 01/14/2020    BILITOT 0.3 12/10/2019    ALKPHOS 66 12/10/2019    AST 20 12/10/2019    ALT 13 12/10/2019     BMP    Lab Results   Component Value Date     01/14/2020    K 4.4 01/14/2020    K 4.1 11/06/2019     01/14/2020    CO2 28 01/14/2020    BUN 14 01/14/2020    CREATININE 0.6 01/14/2020    CALCIUM 9.2 01/14/2020    GFRAA >60 01/14/2020    LABGLOM >60 01/14/2020    LABGLOM 105 04/29/2014    GLUCOSE 94 01/14/2020     POCGlucose  No results for input(s): GLUCOSE in the last 72 hours. Coags  No results found for: PROTIME, INR, APTT  HCG (If Applicable)   Lab Results   Component Value Date    PREGTESTUR Negative 02/21/2020      ABGs No results found for: PHART, PO2ART, UIP1OVD, ONP1TPY, BEART, Q4MEKLRZ   Type & Screen (If Applicable)  No results found for: LABABO, LABRH                         BMI: Wt Readings from Last 3 Encounters:       NPO Status:   Date of last liquid consumption: 02/20/20   Time of last liquid consumption: 2000   Date of last solid food consumption: 02/20/20      Time of last solid consumption: 1830       Anesthesia Evaluation  Patient summary reviewed   history of anesthetic complications: PONV.   Airway: Mallampati: II  TM distance: >3 FB Neck ROM: full   Dental: normal exam         Pulmonary:Negative Pulmonary ROS and normal exam                               Cardiovascular:Negative CV ROS  Exercise tolerance: good (>4 METS),         ECG reviewed  Rhythm: regular  Rate: normal           Beta Blocker:  Not on Beta Blocker         Neuro/Psych:   (+) neuromuscular disease:, TIA (2015 no residual effects per pt; cerebral palsy), headaches:,             GI/Hepatic/Renal: Neg GI/Hepatic/Renal ROS            Endo/Other: Negative Endo/Other ROS                    Abdominal:           Vascular: negative vascular ROS. Anesthesia Plan      MAC     ASA 3       Induction: intravenous. MIPS: Postoperative opioids intended and Prophylactic antiemetics administered. Anesthetic plan and risks discussed with patient. Plan discussed with CRNA. This pre-anesthesia assessment may be used as a history and physical.    DOS STAFF ADDENDUM:    Pt seen and examined, chart reviewed (including anesthesia, drug and allergy history). No interval changes to history and physical examination. Anesthetic plan, risks, benefits, alternatives, and personnel involved discussed with patient. Questions and concerns addressed. Patient(family) verbalized an understanding and agrees to proceed.       Tiesha Rios MD  February 21, 2020  6:52 AM

## 2020-02-25 NOTE — OP NOTE
appropriate coverage with the stimulation. Minor adjustments were made as needed to develop excellent coverage. These same steps were followed to place an 8 electrode lead midline to the left of the first lead. There was no evidence of paresthesia (-) or heme (-), negative for CSF. The old incision in the upper thoracic area was identified, 5 ml of local anesthesia was injection (combination of 0.25% and 1% Lidocaine) along the old incision, I exposed the anchors, I was able to identify that the left anchor belong to the left lead that need to be removed as it was migrated, then I placed the 16 G needle and identified the epidural space using the loss of resistance technique,  An 8 electrode BSC lead was channeled through the epidural needle and guided carefully to the C3-4-5 area. She had an excellent coverage. Following the confirmation, the patient was given more anesthetic to keep him comfortable; we made a 1-inch incision along the needles, in the lumbar area, dissected all the way down to the fascia. Under fluoroscopic guidance the Needle was pulled and the stylet pulled out. Both leads were anchored using the bumpy anchor with 2/0 Ethobond sutures to the fascia. Following that step, the old incision in the left flank area was identified, 5 ml of local anesthesia was injection (combination of 0.25% and 1% Lidocaine) along the old incision, I exposed the generator and pulled it out of the pocket, disconnected the old cervical lead and pulled out through the incision, then I passed the new cervical lead first and then the two lower thoracic leads to the pocket, this after securing the leads with tight anchor for each lead. we checked the impedance and the connections, and came back within normal range. Then, Started to close the wound and started closing with Vicryle 3/0 in 2 layers and the skin with Monocryle 4/0 sutures. Covered the incision with 4 x 4 gauze and tegaderm.     The patient was then taken from the procedure room via stretcher, and placed in a recovery room. Once in the recovery room, the fine tune adjustments were made to the stimulation pattern. This additional programming was needed to give the patient optimal coverage during the trial.  Post operatively the patient was monitored until stable. Following the procedure the patient's vital signs were stable. Patients subjective complaints were reduced by 95% with the NRS score pre and post procedure, Vital Signs and Fluro time are recorded in the patient chart. The patient will continue to follow the previously established treatment plan, and will be seen in a few days for a dressing change and any reprogramming needs.      The patient was discharged home in good condition after being given discharge instructions    Shira Gonzalez MD.

## 2020-03-05 ENCOUNTER — OFFICE VISIT (OUTPATIENT)
Dept: INTERNAL MEDICINE CLINIC | Age: 30
End: 2020-03-05
Payer: MEDICAID

## 2020-03-05 VITALS
WEIGHT: 104 LBS | DIASTOLIC BLOOD PRESSURE: 60 MMHG | BODY MASS INDEX: 16.79 KG/M2 | SYSTOLIC BLOOD PRESSURE: 102 MMHG | OXYGEN SATURATION: 98 % | HEART RATE: 65 BPM

## 2020-03-05 PROCEDURE — 99213 OFFICE O/P EST LOW 20 MIN: CPT | Performed by: NURSE PRACTITIONER

## 2020-03-05 PROCEDURE — G8419 CALC BMI OUT NRM PARAM NOF/U: HCPCS | Performed by: NURSE PRACTITIONER

## 2020-03-05 PROCEDURE — G8427 DOCREV CUR MEDS BY ELIG CLIN: HCPCS | Performed by: NURSE PRACTITIONER

## 2020-03-05 PROCEDURE — 1036F TOBACCO NON-USER: CPT | Performed by: NURSE PRACTITIONER

## 2020-03-05 PROCEDURE — G8484 FLU IMMUNIZE NO ADMIN: HCPCS | Performed by: NURSE PRACTITIONER

## 2020-03-05 RX ORDER — DOXYCYCLINE HYCLATE 100 MG
100 TABLET ORAL 2 TIMES DAILY
Qty: 14 TABLET | Refills: 0 | Status: SHIPPED | OUTPATIENT
Start: 2020-03-05 | End: 2020-03-12

## 2020-03-05 ASSESSMENT — PATIENT HEALTH QUESTIONNAIRE - PHQ9
SUM OF ALL RESPONSES TO PHQ QUESTIONS 1-9: 0
1. LITTLE INTEREST OR PLEASURE IN DOING THINGS: 0
SUM OF ALL RESPONSES TO PHQ QUESTIONS 1-9: 0
2. FEELING DOWN, DEPRESSED OR HOPELESS: 0
SUM OF ALL RESPONSES TO PHQ9 QUESTIONS 1 & 2: 0

## 2020-03-06 ASSESSMENT — ENCOUNTER SYMPTOMS
COUGH: 1
SHORTNESS OF BREATH: 1
SORE THROAT: 0
ABDOMINAL PAIN: 0
SINUS PAIN: 0
BACK PAIN: 0
SINUS PRESSURE: 0
COLOR CHANGE: 0
DIARRHEA: 0
CONSTIPATION: 0
WHEEZING: 0

## 2020-03-06 NOTE — PROGRESS NOTES
Rhythm: Normal rate and regular rhythm. Heart sounds: Normal heart sounds. Pulmonary:      Effort: Pulmonary effort is normal. No respiratory distress. Breath sounds: Normal breath sounds. No wheezing or rhonchi. Musculoskeletal: Normal range of motion. General: No deformity. Skin:     General: Skin is warm and dry. Capillary Refill: Capillary refill takes less than 2 seconds. Neurological:      Mental Status: She is alert and oriented to person, place, and time. Psychiatric:         Mood and Affect: Mood normal.         Behavior: Behavior normal.       Assessment:      See Problem List assessment and plan       Plan:       URI (upper respiratory infection)  Lungs clear   Given recent surgery and flu, will cover with doxycycline   Treat symptoms, suggested mucinex and tylenol   Instructed to up date surgeon   Call if symptoms worsen or fail to improve         Patient engaged in shared decision making. Information given to evaluate options of treatment, understand what is needed and discuss importance of following plan.

## 2020-03-10 ENCOUNTER — TELEPHONE (OUTPATIENT)
Dept: INTERNAL MEDICINE CLINIC | Age: 30
End: 2020-03-10

## 2020-03-10 NOTE — TELEPHONE ENCOUNTER
Pt recently had visit with Jigar Day, diagnosed with pneumonia. She is still taking meds prescribed but yesterday she started coughing up green mucus. She asks if something else needs to be called in or just finish current meds? Pharmacy is [address], phone [phone].

## 2020-03-12 ENCOUNTER — OFFICE VISIT (OUTPATIENT)
Dept: INTERNAL MEDICINE CLINIC | Age: 30
End: 2020-03-12
Payer: MEDICAID

## 2020-03-12 VITALS
SYSTOLIC BLOOD PRESSURE: 84 MMHG | DIASTOLIC BLOOD PRESSURE: 60 MMHG | WEIGHT: 104 LBS | BODY MASS INDEX: 16.79 KG/M2 | TEMPERATURE: 98 F

## 2020-03-12 PROCEDURE — 1036F TOBACCO NON-USER: CPT | Performed by: NURSE PRACTITIONER

## 2020-03-12 PROCEDURE — G8484 FLU IMMUNIZE NO ADMIN: HCPCS | Performed by: NURSE PRACTITIONER

## 2020-03-12 PROCEDURE — G8419 CALC BMI OUT NRM PARAM NOF/U: HCPCS | Performed by: NURSE PRACTITIONER

## 2020-03-12 PROCEDURE — G8427 DOCREV CUR MEDS BY ELIG CLIN: HCPCS | Performed by: NURSE PRACTITIONER

## 2020-03-12 PROCEDURE — 99213 OFFICE O/P EST LOW 20 MIN: CPT | Performed by: NURSE PRACTITIONER

## 2020-03-12 ASSESSMENT — ENCOUNTER SYMPTOMS
EYE REDNESS: 0
WHEEZING: 0
DIARRHEA: 0
COLOR CHANGE: 0
NAUSEA: 0
BACK PAIN: 0
ABDOMINAL PAIN: 0
BLOOD IN STOOL: 0
SINUS PRESSURE: 0
SHORTNESS OF BREATH: 0
RHINORRHEA: 0
VOMITING: 0
SORE THROAT: 0
CONSTIPATION: 0
CHEST TIGHTNESS: 0
EYE ITCHING: 0
COUGH: 0

## 2020-03-12 ASSESSMENT — PATIENT HEALTH QUESTIONNAIRE - PHQ9
SUM OF ALL RESPONSES TO PHQ QUESTIONS 1-9: 0
2. FEELING DOWN, DEPRESSED OR HOPELESS: 0
SUM OF ALL RESPONSES TO PHQ9 QUESTIONS 1 & 2: 0
1. LITTLE INTEREST OR PLEASURE IN DOING THINGS: 0
SUM OF ALL RESPONSES TO PHQ QUESTIONS 1-9: 0

## 2020-03-12 NOTE — ASSESSMENT & PLAN NOTE
She has completed abx  No additional abx needed  Cough meds otc as needed  Continue with symptomatic management. Recommend increased water intake as well as saline irrigation, mucolytics, and antihistamines as needed. Advised to call back if no improvement over the next 4-7 days. Antibiotic not indicated, discussed rationale of not giving antibiotics due to, but not limited to, side effects and build up of resistance. Advised if fever develops, symptoms worsen, or fail to improve with symptom management to return to office.

## 2020-03-12 NOTE — PROGRESS NOTES
Subjective:      Patient ID: Pamela Barba is a 34 y.o. female. Chief Complaint   Patient presents with    Other     Green mucus       HPI  Teresita Bolton is in the office today with cough productive of sputum. She was just treated for PNA with abx from different provider. She wants to be sure that she does not need additional round. She is not short of breath, no chest pain, she is not having fevers. She denies any sinus pain or pressure. She has no ear or throat pain. Over all she feels well. Recently had spine surgery and in brace. Review of Systems   Constitutional: Negative for chills, fatigue and fever. HENT: Negative for congestion, ear pain, postnasal drip, rhinorrhea, sinus pressure, sneezing and sore throat. Eyes: Negative for redness and itching. Respiratory: Negative for cough (productive of green sputum), chest tightness, shortness of breath and wheezing. Cardiovascular: Negative for chest pain and palpitations. Gastrointestinal: Negative for abdominal pain, blood in stool, constipation, diarrhea, nausea and vomiting. Endocrine: Negative for cold intolerance and heat intolerance. Genitourinary: Negative for difficulty urinating, dysuria, flank pain, frequency, hematuria and urgency. Musculoskeletal: Negative for arthralgias, back pain, joint swelling and myalgias. Skin: Negative for color change, pallor, rash and wound. Allergic/Immunologic: Negative for environmental allergies and food allergies. Neurological: Negative for dizziness, seizures, syncope, weakness, light-headedness, numbness and headaches. Hematological: Negative for adenopathy. Does not bruise/bleed easily. Psychiatric/Behavioral: Negative for confusion, sleep disturbance and suicidal ideas. The patient is not nervous/anxious and is not hyperactive. Objective:   Physical Exam  Constitutional:       Appearance: She is well-developed. HENT:      Head: Normocephalic.       Right Ear: External ear normal. Left Ear: External ear normal.   Eyes:      Conjunctiva/sclera: Conjunctivae normal.      Pupils: Pupils are equal, round, and reactive to light. Neck:      Musculoskeletal: Normal range of motion and neck supple. Vascular: No JVD. Cardiovascular:      Rate and Rhythm: Normal rate and regular rhythm. Heart sounds: No murmur. No friction rub. No gallop. Pulmonary:      Effort: Pulmonary effort is normal. No respiratory distress. Breath sounds: Normal breath sounds. No wheezing. Abdominal:      General: Bowel sounds are normal. There is no distension. Palpations: Abdomen is soft. There is no mass. Tenderness: There is no abdominal tenderness. There is no guarding or rebound. Musculoskeletal: Normal range of motion. General: No tenderness. Skin:     General: Skin is warm and dry. Neurological:      Mental Status: She is alert and oriented to person, place, and time. Deep Tendon Reflexes: Reflexes are normal and symmetric. Psychiatric:         Behavior: Behavior normal.         Assessment:      See Problem List assessment and plan       Plan:       URI (upper respiratory infection)  She has completed abx  No additional abx needed  Cough meds otc as needed  Continue with symptomatic management. Recommend increased water intake as well as saline irrigation, mucolytics, and antihistamines as needed. Advised to call back if no improvement over the next 4-7 days. Antibiotic not indicated, discussed rationale of not giving antibiotics due to, but not limited to, side effects and build up of resistance. Advised if fever develops, symptoms worsen, or fail to improve with symptom management to return to office. Patient engaged in shared decision making. Information given to evaluate options of treatment, understand what is needed and discuss importance of following plan.

## 2020-04-27 ENCOUNTER — TELEPHONE (OUTPATIENT)
Dept: INTERNAL MEDICINE CLINIC | Age: 30
End: 2020-04-27

## 2020-04-27 ENCOUNTER — APPOINTMENT (OUTPATIENT)
Dept: CT IMAGING | Age: 30
End: 2020-04-27
Payer: MEDICAID

## 2020-04-27 ENCOUNTER — HOSPITAL ENCOUNTER (EMERGENCY)
Age: 30
Discharge: HOME | End: 2020-04-27
Attending: EMERGENCY MEDICINE
Payer: MEDICAID

## 2020-04-27 VITALS
RESPIRATION RATE: 16 BRPM | DIASTOLIC BLOOD PRESSURE: 69 MMHG | TEMPERATURE: 98.2 F | SYSTOLIC BLOOD PRESSURE: 105 MMHG | OXYGEN SATURATION: 100 % | HEART RATE: 89 BPM

## 2020-04-27 LAB
AMORPHOUS: ABNORMAL /HPF
ANION GAP SERPL CALCULATED.3IONS-SCNC: 9 MMOL/L (ref 3–16)
BACTERIA: ABNORMAL /HPF
BASOPHILS ABSOLUTE: 0 K/UL (ref 0–0.2)
BASOPHILS RELATIVE PERCENT: 1.2 %
BILIRUBIN URINE: NEGATIVE
BLOOD, URINE: ABNORMAL
BUN BLDV-MCNC: 11 MG/DL (ref 7–20)
CALCIUM SERPL-MCNC: 9 MG/DL (ref 8.3–10.6)
CHLORIDE BLD-SCNC: 104 MMOL/L (ref 99–110)
CLARITY: CLEAR
CO2: 30 MMOL/L (ref 21–32)
COLOR: YELLOW
CREAT SERPL-MCNC: 0.6 MG/DL (ref 0.6–1.1)
EKG ATRIAL RATE: 91 BPM
EKG DIAGNOSIS: NORMAL
EKG P AXIS: 51 DEGREES
EKG P-R INTERVAL: 110 MS
EKG Q-T INTERVAL: 354 MS
EKG QRS DURATION: 82 MS
EKG QTC CALCULATION (BAZETT): 435 MS
EKG R AXIS: 52 DEGREES
EKG T AXIS: 18 DEGREES
EKG VENTRICULAR RATE: 91 BPM
EOSINOPHILS ABSOLUTE: 0.1 K/UL (ref 0–0.6)
EOSINOPHILS RELATIVE PERCENT: 2 %
EPITHELIAL CELLS, UA: ABNORMAL /HPF (ref 0–5)
GFR AFRICAN AMERICAN: >60
GFR NON-AFRICAN AMERICAN: >60
GLUCOSE BLD-MCNC: 107 MG/DL (ref 70–99)
GLUCOSE URINE: NEGATIVE MG/DL
HCG(URINE) PREGNANCY TEST: NEGATIVE
HCT VFR BLD CALC: 36.1 % (ref 36–48)
HEMOGLOBIN: 11.8 G/DL (ref 12–16)
KETONES, URINE: ABNORMAL MG/DL
LEUKOCYTE ESTERASE, URINE: NEGATIVE
LYMPHOCYTES ABSOLUTE: 1.4 K/UL (ref 1–5.1)
LYMPHOCYTES RELATIVE PERCENT: 37.6 %
MCH RBC QN AUTO: 30.6 PG (ref 26–34)
MCHC RBC AUTO-ENTMCNC: 32.7 G/DL (ref 31–36)
MCV RBC AUTO: 93.7 FL (ref 80–100)
MICROSCOPIC EXAMINATION: YES
MONOCYTES ABSOLUTE: 0.3 K/UL (ref 0–1.3)
MONOCYTES RELATIVE PERCENT: 8.7 %
MUCUS: ABNORMAL /LPF
NEUTROPHILS ABSOLUTE: 1.9 K/UL (ref 1.7–7.7)
NEUTROPHILS RELATIVE PERCENT: 50.5 %
NITRITE, URINE: NEGATIVE
PDW BLD-RTO: 14.9 % (ref 12.4–15.4)
PH UA: 8 (ref 5–8)
PLATELET # BLD: 188 K/UL (ref 135–450)
PMV BLD AUTO: 9.2 FL (ref 5–10.5)
POTASSIUM REFLEX MAGNESIUM: 4.1 MMOL/L (ref 3.5–5.1)
PROTEIN UA: NEGATIVE MG/DL
RBC # BLD: 3.85 M/UL (ref 4–5.2)
RBC UA: ABNORMAL /HPF (ref 0–4)
SODIUM BLD-SCNC: 143 MMOL/L (ref 136–145)
SPECIFIC GRAVITY UA: 1.02 (ref 1–1.03)
URINE TYPE: ABNORMAL
UROBILINOGEN, URINE: 0.2 E.U./DL
WBC # BLD: 3.7 K/UL (ref 4–11)
WBC UA: ABNORMAL /HPF (ref 0–5)

## 2020-04-27 PROCEDURE — 85025 COMPLETE CBC W/AUTO DIFF WBC: CPT

## 2020-04-27 PROCEDURE — 93005 ELECTROCARDIOGRAM TRACING: CPT | Performed by: PHYSICIAN ASSISTANT

## 2020-04-27 PROCEDURE — 99284 EMERGENCY DEPT VISIT MOD MDM: CPT

## 2020-04-27 PROCEDURE — 80048 BASIC METABOLIC PNL TOTAL CA: CPT

## 2020-04-27 PROCEDURE — 81001 URINALYSIS AUTO W/SCOPE: CPT

## 2020-04-27 PROCEDURE — 6360000004 HC RX CONTRAST MEDICATION: Performed by: PHYSICIAN ASSISTANT

## 2020-04-27 PROCEDURE — 84703 CHORIONIC GONADOTROPIN ASSAY: CPT

## 2020-04-27 PROCEDURE — 70498 CT ANGIOGRAPHY NECK: CPT

## 2020-04-27 RX ORDER — 0.9 % SODIUM CHLORIDE 0.9 %
500 INTRAVENOUS SOLUTION INTRAVENOUS ONCE
Status: DISCONTINUED | OUTPATIENT
Start: 2020-04-27 | End: 2020-04-27 | Stop reason: HOSPADM

## 2020-04-27 RX ADMIN — IOPAMIDOL 80 ML: 755 INJECTION, SOLUTION INTRAVENOUS at 19:18

## 2020-04-27 ASSESSMENT — ENCOUNTER SYMPTOMS
BACK PAIN: 0
COUGH: 0
NAUSEA: 0
ABDOMINAL PAIN: 0
SHORTNESS OF BREATH: 0
PHOTOPHOBIA: 0
STRIDOR: 0
TROUBLE SWALLOWING: 0
FACIAL SWELLING: 0
VOICE CHANGE: 0
RHINORRHEA: 0
DIARRHEA: 0
CHEST TIGHTNESS: 0
WHEEZING: 0
VOMITING: 0

## 2020-04-27 ASSESSMENT — PAIN SCALES - GENERAL: PAINLEVEL_OUTOF10: 7

## 2020-04-27 ASSESSMENT — PAIN DESCRIPTION - DESCRIPTORS: DESCRIPTORS: ACHING;CONSTANT

## 2020-04-27 ASSESSMENT — PAIN DESCRIPTION - ORIENTATION: ORIENTATION: LEFT

## 2020-04-27 ASSESSMENT — PAIN DESCRIPTION - LOCATION: LOCATION: HEAD

## 2020-04-27 ASSESSMENT — PAIN DESCRIPTION - PAIN TYPE: TYPE: ACUTE PAIN

## 2020-04-27 NOTE — ED PROVIDER NOTES
[address] [facility]    Schedule an appointment as soon as possible for a visit       your dentist          [facility] Emergency Department  [address] [address]  [phone]    If symptoms worsen      DISCHARGE MEDICATIONS:  New Prescriptions    No medications on file       DISPOSITION  discharged        Monet Pisanoma  04/27/20 4698

## 2020-04-28 ENCOUNTER — TELEPHONE (OUTPATIENT)
Dept: INTERNAL MEDICINE CLINIC | Age: 30
End: 2020-04-28

## 2020-06-02 ENCOUNTER — OFFICE VISIT (OUTPATIENT)
Dept: FAMILY MEDICINE CLINIC | Age: 30
End: 2020-06-02
Payer: MEDICAID

## 2020-06-02 VITALS
SYSTOLIC BLOOD PRESSURE: 100 MMHG | OXYGEN SATURATION: 100 % | HEART RATE: 97 BPM | BODY MASS INDEX: 19.27 KG/M2 | DIASTOLIC BLOOD PRESSURE: 61 MMHG | WEIGHT: 119.4 LBS | TEMPERATURE: 98.6 F | RESPIRATION RATE: 12 BRPM

## 2020-06-02 PROCEDURE — G8420 CALC BMI NORM PARAMETERS: HCPCS | Performed by: NURSE PRACTITIONER

## 2020-06-02 PROCEDURE — 99213 OFFICE O/P EST LOW 20 MIN: CPT | Performed by: NURSE PRACTITIONER

## 2020-06-02 PROCEDURE — G8427 DOCREV CUR MEDS BY ELIG CLIN: HCPCS | Performed by: NURSE PRACTITIONER

## 2020-06-02 PROCEDURE — 1036F TOBACCO NON-USER: CPT | Performed by: NURSE PRACTITIONER

## 2020-06-02 RX ORDER — DOXYCYCLINE HYCLATE 100 MG
100 TABLET ORAL 2 TIMES DAILY
Qty: 14 TABLET | Refills: 0 | Status: SHIPPED | OUTPATIENT
Start: 2020-06-02 | End: 2020-06-09

## 2020-06-02 ASSESSMENT — ENCOUNTER SYMPTOMS
DIARRHEA: 0
SHORTNESS OF BREATH: 0
SINUS PAIN: 0
COUGH: 0
COLOR CHANGE: 1
BACK PAIN: 0
CONSTIPATION: 0
ABDOMINAL PAIN: 0
WHEEZING: 0
SINUS PRESSURE: 0

## 2020-06-12 ENCOUNTER — OFFICE VISIT (OUTPATIENT)
Dept: DERMATOLOGY | Age: 30
End: 2020-06-12
Payer: MEDICAID

## 2020-06-12 VITALS — TEMPERATURE: 98.1 F

## 2020-06-12 PROCEDURE — 1036F TOBACCO NON-USER: CPT | Performed by: DERMATOLOGY

## 2020-06-12 PROCEDURE — G8427 DOCREV CUR MEDS BY ELIG CLIN: HCPCS | Performed by: DERMATOLOGY

## 2020-06-12 PROCEDURE — G8420 CALC BMI NORM PARAMETERS: HCPCS | Performed by: DERMATOLOGY

## 2020-06-12 PROCEDURE — 99213 OFFICE O/P EST LOW 20 MIN: CPT | Performed by: DERMATOLOGY

## 2020-06-12 NOTE — PROGRESS NOTES
Breast/axilla/chest.    The following were examined and determined to be abnormal: Scalp/hair, Head/face and LUE. Kimble phototype: 2    -General: A+Ox3, NAD, well-nourished, well-developed. Areas of skin examined as listed above:  1. 2-3 inflammatory papules to melolabial folds, 2-3 closed comedones to cheeks and chin, neck, chest-clear  2. Moderate yellow greasy scale diffusely scattered to scalp, no erythema  3. Negative hair pull test, no obvious patches of hair loss, decreased density diffusely to scalp  4. Left dorsal hand near base of thumb- healing superficial erosion with surrounding hyperpigmentation  Assessment and Plan     1. Acne vulgaris    2. Seborrheic dermatitis of scalp    3. Telogen effluvium    4. Phytophotodermatitis        1. Acne vulgaris  mild severity. Continue current treatment regimen:   -Wash face twice per day With mild gentle cleanser  -Recommend moisturizing nightly with CeraVe p.m. Moisturizer.    -Concern for hormonal component to her acne. Patient not a good candidate for oral contraceptive pills, patient's choice to not proceed with spironolactone as she is not interested in possible side effects of a systemic medication at this time. Prefers to use only topical medications at this time. Continue with Aczone 7.5% gel apply thin layer to affected areas on face every morning  -Start tretinoin 0.025% cream, apply-pea-sized amount to affected areas on face every other night increasing to nightly as tolerated. -Discussed Common side effects include: burning/stinging, redness, dryness, peeling and itching. These side effects typically decrease with continued use of the medication. Pt may use moisturizing creams or lotions to help reduce these side effects. If side effects continue, pt may decrease use of the medication to once every 2 to 3 days. STOP using this medication of patient becomes pregnant.      Reminded patient may take 6 months to see significant

## 2020-06-12 NOTE — PATIENT INSTRUCTIONS
Sun Protection Tips    Apply broad spectrum water resistant sunscreen with an SPF of at least 30 to exposed areas of the skin. Dont forget the ears and lips! Remember to reapply sunscreen about every 2 hours and after swimming or sweating. Wear sun protective clothing. Swim shirts (aka. rash guards) are a great idea and negates the need to reapply sunscreen in those areas. Seek the shade whenever possible especially between the hours of 10am and 4pm when the suns rays are the strongest.     Avoid tanning beds          Wear a wide brim hat while in the sun    Tretinoin / Differin (Adapalene) / Tazorac (Tazarotene)     Your physician has prescribed a topical medication that affects the growth and turnover of skin cells. We are providing you with the following information so that you understand how the medication should be used and potential side effects you may experience. Cruz Wash your face with mild soap and water, then allow to dry before applying the medication.  Apply a pea-sized amount to your entire face. Applying more than this may result in more irritation. Use the minimum amount possible to areas such as the corners of the mouth, the angles of the nose and the eyelids.  Common side effects include: burning/stinging, redness, dryness, peeling and itching. These side effects typically decrease with continued use of the medication.  You may use moisturizing creams or lotions to help reduce these side effects.  If you continue to experience these side effects, you may decrease your use of the medication to once every 2 or 3 days.  Your acne may appear worse during the first few weeks of using the medication.  It may take 6 weeks or more to notice improvement from the medication.  STOP using the medication if you become pregnant. Thanks for your visit!  Feel free to send  Dr. Montoya  assistant, Mariam, a [employer] message for any questions, concerns or  to schedule your cosmetic procedures.

## 2020-10-19 ENCOUNTER — NURSE TRIAGE (OUTPATIENT)
Dept: OTHER | Facility: CLINIC | Age: 30
End: 2020-10-19

## 2020-10-19 ENCOUNTER — OFFICE VISIT (OUTPATIENT)
Dept: GYNECOLOGY | Age: 30
End: 2020-10-19
Payer: MEDICAID

## 2020-10-19 VITALS
TEMPERATURE: 97.9 F | SYSTOLIC BLOOD PRESSURE: 112 MMHG | DIASTOLIC BLOOD PRESSURE: 70 MMHG | WEIGHT: 124 LBS | HEIGHT: 66 IN | RESPIRATION RATE: 17 BRPM | HEART RATE: 97 BPM | BODY MASS INDEX: 19.93 KG/M2

## 2020-10-19 PROCEDURE — 99395 PREV VISIT EST AGE 18-39: CPT | Performed by: OBSTETRICS & GYNECOLOGY

## 2020-10-19 PROCEDURE — G8484 FLU IMMUNIZE NO ADMIN: HCPCS | Performed by: OBSTETRICS & GYNECOLOGY

## 2020-10-19 ASSESSMENT — ENCOUNTER SYMPTOMS
RESPIRATORY NEGATIVE: 1
EYES NEGATIVE: 1
GASTROINTESTINAL NEGATIVE: 1

## 2020-10-19 NOTE — TELEPHONE ENCOUNTER
pregnant? \" \"When was your last menstrual period? \"        No    Protocols used: ABDOMINAL PAIN - FEMALE-ADULT-OH    2nd level triage with April Deneen, RYAN. [id number] Lillian Sheffield for caller to be seen today or tomorrow by PCP. Thank you for allowing me to participate in the care of your patient. The patient was connected to triage in response to information provided to the ECC. Please do not respond through this encounter as the response is not directed to a shared pool.

## 2020-10-20 ENCOUNTER — VIRTUAL VISIT (OUTPATIENT)
Dept: INTERNAL MEDICINE CLINIC | Age: 30
End: 2020-10-20
Payer: MEDICAID

## 2020-10-20 PROCEDURE — 1036F TOBACCO NON-USER: CPT | Performed by: NURSE PRACTITIONER

## 2020-10-20 PROCEDURE — 99214 OFFICE O/P EST MOD 30 MIN: CPT | Performed by: NURSE PRACTITIONER

## 2020-10-20 PROCEDURE — G8484 FLU IMMUNIZE NO ADMIN: HCPCS | Performed by: NURSE PRACTITIONER

## 2020-10-20 PROCEDURE — G8428 CUR MEDS NOT DOCUMENT: HCPCS | Performed by: NURSE PRACTITIONER

## 2020-10-20 PROCEDURE — G8420 CALC BMI NORM PARAMETERS: HCPCS | Performed by: NURSE PRACTITIONER

## 2020-10-20 ASSESSMENT — ENCOUNTER SYMPTOMS
SHORTNESS OF BREATH: 0
DIARRHEA: 0
VOMITING: 0
BACK PAIN: 0
COLOR CHANGE: 0
COUGH: 0
NAUSEA: 0
ABDOMINAL PAIN: 1
CONSTIPATION: 0
WHEEZING: 0
SINUS PRESSURE: 0
SINUS PAIN: 0

## 2020-10-20 NOTE — ASSESSMENT & PLAN NOTE
Obtain US   Eval liver cyst and Gallbladder   ER if pain becomes severe  Call if symptoms worsen or fail to improve never used
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
## 2020-10-21 ENCOUNTER — HOSPITAL ENCOUNTER (OUTPATIENT)
Dept: ULTRASOUND IMAGING | Age: 30
Discharge: HOME | End: 2020-10-21
Payer: MEDICAID

## 2020-10-21 ENCOUNTER — PATIENT MESSAGE (OUTPATIENT)
Dept: INTERNAL MEDICINE CLINIC | Age: 30
End: 2020-10-21

## 2020-10-21 PROCEDURE — 76705 ECHO EXAM OF ABDOMEN: CPT

## 2020-10-21 NOTE — TELEPHONE ENCOUNTER
From: Karen Barrera  To: Kari Cook APRN - CNP  Sent: 10/21/2020 9:16 AM EDT  Subject: Test Results Question    Skylarlizbeth Trotter,    This morning when the ultrasound technician was performing the imaging, she told me that my pain is directly over my right kidney. When you get the results, will you let me know if the test included my right kidney or just gallbladder, liver, pancreas? Thank you! I thought it would be helpful for you to know where pain was identified.

## 2020-10-22 ENCOUNTER — TELEPHONE (OUTPATIENT)
Dept: INTERNAL MEDICINE CLINIC | Age: 30
End: 2020-10-22

## 2020-11-02 ENCOUNTER — HOSPITAL ENCOUNTER (OUTPATIENT)
Dept: NUCLEAR MEDICINE | Age: 30
Discharge: HOME | End: 2020-11-02
Payer: MEDICAID

## 2020-11-02 VITALS — WEIGHT: 120 LBS | BODY MASS INDEX: 19.37 KG/M2

## 2020-11-02 PROCEDURE — A9537 TC99M MEBROFENIN: HCPCS | Performed by: NURSE PRACTITIONER

## 2020-11-02 PROCEDURE — 78227 HEPATOBIL SYST IMAGE W/DRUG: CPT

## 2020-11-02 PROCEDURE — 3430000000 HC RX DIAGNOSTIC RADIOPHARMACEUTICAL: Performed by: NURSE PRACTITIONER

## 2020-11-02 PROCEDURE — 2580000003 HC RX 258: Performed by: NURSE PRACTITIONER

## 2020-11-02 PROCEDURE — 6360000004 HC RX CONTRAST MEDICATION: Performed by: NURSE PRACTITIONER

## 2020-11-02 RX ORDER — SODIUM CHLORIDE 9 MG/ML
INJECTION, SOLUTION INTRAVENOUS ONCE
Status: COMPLETED | OUTPATIENT
Start: 2020-11-02 | End: 2020-11-02

## 2020-11-02 RX ORDER — SODIUM CHLORIDE 0.9 % (FLUSH) 0.9 %
10 SYRINGE (ML) INJECTION PRN
Status: DISCONTINUED | OUTPATIENT
Start: 2020-11-02 | End: 2020-11-03 | Stop reason: HOSPADM

## 2020-11-02 RX ADMIN — Medication 4.82 MILLICURIE: at 09:41

## 2020-11-02 RX ADMIN — SODIUM CHLORIDE 100 ML: 9 INJECTION, SOLUTION INTRAVENOUS at 10:49

## 2020-11-02 RX ADMIN — Medication 10 ML: at 09:41

## 2020-11-02 RX ADMIN — SINCALIDE 1.09 MCG: 5 INJECTION, POWDER, LYOPHILIZED, FOR SOLUTION INTRAVENOUS at 10:50

## 2020-11-11 ENCOUNTER — OFFICE VISIT (OUTPATIENT)
Dept: INTERNAL MEDICINE CLINIC | Age: 30
End: 2020-11-11
Payer: MEDICAID

## 2020-11-11 VITALS
WEIGHT: 118.7 LBS | TEMPERATURE: 97.5 F | HEART RATE: 70 BPM | SYSTOLIC BLOOD PRESSURE: 118 MMHG | DIASTOLIC BLOOD PRESSURE: 66 MMHG | OXYGEN SATURATION: 97 % | BODY MASS INDEX: 19.16 KG/M2

## 2020-11-11 PROCEDURE — G8484 FLU IMMUNIZE NO ADMIN: HCPCS | Performed by: NURSE PRACTITIONER

## 2020-11-11 PROCEDURE — 99213 OFFICE O/P EST LOW 20 MIN: CPT | Performed by: NURSE PRACTITIONER

## 2020-11-11 PROCEDURE — 1036F TOBACCO NON-USER: CPT | Performed by: NURSE PRACTITIONER

## 2020-11-11 PROCEDURE — G8420 CALC BMI NORM PARAMETERS: HCPCS | Performed by: NURSE PRACTITIONER

## 2020-11-11 PROCEDURE — G8427 DOCREV CUR MEDS BY ELIG CLIN: HCPCS | Performed by: NURSE PRACTITIONER

## 2020-11-11 ASSESSMENT — PATIENT HEALTH QUESTIONNAIRE - PHQ9
1. LITTLE INTEREST OR PLEASURE IN DOING THINGS: 0
DEPRESSION UNABLE TO ASSESS: FUNCTIONAL CAPACITY MOTIVATION LIMITS ACCURACY
SUM OF ALL RESPONSES TO PHQ QUESTIONS 1-9: 0
SUM OF ALL RESPONSES TO PHQ9 QUESTIONS 1 & 2: 0
SUM OF ALL RESPONSES TO PHQ QUESTIONS 1-9: 0
2. FEELING DOWN, DEPRESSED OR HOPELESS: 0
SUM OF ALL RESPONSES TO PHQ QUESTIONS 1-9: 0

## 2020-11-11 NOTE — PROGRESS NOTES
Subjective:      Patient ID: Og Coon is a 31 y.o. female. Chief Complaint   Patient presents with    Abdominal Pain       HPI   Here for RUQ pain that has been ongoing for over a month. She reports pain began suddenly and has been constant since that time. Pain is described as sharp and stabbing. Worsens after eating. Worsens with jolting movements. Denies nausea or vomiting. She is trying to eat smaller meals but this has not helped much. She has not tried any medications. She has had normal gallbladder US and HIDA scan which showed EF of 96%. Review of Systems   Constitutional: Negative for chills, fatigue and fever. HENT: Negative for congestion, sinus pressure and sinus pain. Respiratory: Negative for cough, shortness of breath and wheezing. Cardiovascular: Negative for chest pain and palpitations. Gastrointestinal: Positive for abdominal pain (RUQ pain). Negative for constipation and diarrhea. Musculoskeletal: Negative for arthralgias, back pain and myalgias. Skin: Negative for color change, pallor and rash. Neurological: Negative for dizziness, syncope, weakness, light-headedness and headaches. Psychiatric/Behavioral: Negative for behavioral problems, confusion and sleep disturbance. The patient is not nervous/anxious. Objective:   Physical Exam  Constitutional:       Appearance: She is well-developed. HENT:      Head: Normocephalic and atraumatic. Eyes:      Conjunctiva/sclera: Conjunctivae normal.      Pupils: Pupils are equal, round, and reactive to light. Neck:      Musculoskeletal: Normal range of motion and neck supple. Thyroid: No thyromegaly. Vascular: No JVD. Cardiovascular:      Rate and Rhythm: Normal rate and regular rhythm. Heart sounds: Normal heart sounds. Pulmonary:      Effort: Pulmonary effort is normal. No respiratory distress. Breath sounds: Normal breath sounds. No wheezing.    Abdominal:      General: Bowel sounds are normal.      Palpations: Abdomen is soft. Tenderness: There is abdominal tenderness in the right upper quadrant. Musculoskeletal: Normal range of motion. General: No deformity. Skin:     General: Skin is warm and dry. Capillary Refill: Capillary refill takes less than 2 seconds. Neurological:      Mental Status: She is alert and oriented to person, place, and time. Psychiatric:         Behavior: Behavior normal.         Assessment:      See Problem List assessment and plan       Plan:       RUQ pain  Reviewed gallbladder US and HIDA scan   Discussed options   Given RUQ tenderness and ongoing symptoms, will refer to surgery to discuss if cholecystectomy would be an option   Consider GI referral if not               Patient engaged in shared decision making. Information given to evaluate options of treatment, understand what is needed and discuss importance of following plan.

## 2020-11-12 ASSESSMENT — ENCOUNTER SYMPTOMS
DIARRHEA: 0
SINUS PRESSURE: 0
ABDOMINAL PAIN: 1
SHORTNESS OF BREATH: 0
COUGH: 0
COLOR CHANGE: 0
WHEEZING: 0
SINUS PAIN: 0
BACK PAIN: 0
CONSTIPATION: 0

## 2020-11-13 NOTE — ASSESSMENT & PLAN NOTE
Reviewed gallbladder US and HIDA scan   Discussed options   Given RUQ tenderness and ongoing symptoms, will refer to surgery to discuss if cholecystectomy would be an option   Consider GI referral if not

## 2020-11-16 ENCOUNTER — OFFICE VISIT (OUTPATIENT)
Dept: SURGERY | Age: 30
End: 2020-11-16
Payer: MEDICAID

## 2020-11-16 VITALS
HEIGHT: 66 IN | DIASTOLIC BLOOD PRESSURE: 71 MMHG | WEIGHT: 120.4 LBS | SYSTOLIC BLOOD PRESSURE: 105 MMHG | BODY MASS INDEX: 19.35 KG/M2 | HEART RATE: 86 BPM | TEMPERATURE: 97.5 F | RESPIRATION RATE: 16 BRPM

## 2020-11-16 PROCEDURE — G8427 DOCREV CUR MEDS BY ELIG CLIN: HCPCS | Performed by: SURGERY

## 2020-11-16 PROCEDURE — 99213 OFFICE O/P EST LOW 20 MIN: CPT | Performed by: SURGERY

## 2020-11-16 PROCEDURE — 1036F TOBACCO NON-USER: CPT | Performed by: SURGERY

## 2020-11-16 PROCEDURE — G8420 CALC BMI NORM PARAMETERS: HCPCS | Performed by: SURGERY

## 2020-11-16 PROCEDURE — G8484 FLU IMMUNIZE NO ADMIN: HCPCS | Performed by: SURGERY

## 2020-11-16 NOTE — LETTER
[facility] - [facility] [place] [address] [phone]  f [phone]                                                                                                      Kingsley Sanchez MD                                         SURGERY ORDER   -- Time of order -- 20    4:09 PM    Facility:   [facility] # _________________                                  Scheduled by: Alfred Desir  Surgery Date & Time: 2020 @ 1045                    Pt arrival: [address] [place] [address]     Patient Name:  Skyler Rose     :  1990 PCP:  Christianne Leslie [address] [place] [address] Ph:    [phone] (home)                                                     PROCEDURE:  Laparoscopic Cholecystectomy with Cholangiogram                             [id number]: Laparoscopic Cholecystectomy with Cholangiogram   DIAGNOSIS:      ICD-10-CM    1.  Biliary dyskinesia  K82.8        Anesthesia: _General  Time Needed:  1 hour   Pt Position:  supine         Outpatient __x__ Admit ______  Assist._____  Pre-Op H & P to be done by: ___      Labs Needed:   CBC ___  PT/PTT___ INR ____  CMP ___ EKG ___   Urine Hcg ___    Cardiac Clearance ___  (if Desirae Delta to be done by) __________________    Patient to meet with Anesthesiology prior to surgery : no     Medications to be stopped 5 days before surgery: _________  Additional / Special Orders:     **Ancef 2 gm IV OCTOR   (If patient weight is > 120 kg, give 3 gm IV OCTOR)

## 2020-11-17 ENCOUNTER — TELEPHONE (OUTPATIENT)
Dept: SURGERY | Age: 30
End: 2020-11-17

## 2020-11-18 ENCOUNTER — NURSE ONLY (OUTPATIENT)
Dept: PRIMARY CARE CLINIC | Age: 30
End: 2020-11-18
Payer: MEDICAID

## 2020-11-18 ENCOUNTER — TELEPHONE (OUTPATIENT)
Dept: SURGERY | Age: 30
End: 2020-11-18

## 2020-11-18 PROCEDURE — 99211 OFF/OP EST MAY X REQ PHY/QHP: CPT | Performed by: NURSE PRACTITIONER

## 2020-11-18 NOTE — TELEPHONE ENCOUNTER
Pt has surgery next Tuesday 11.24  Her preop is scheduled for Monday 11.23  Pt wants to make sure it is ok that her preop is the day before surgery?     Please advise: [phone]

## 2020-11-18 NOTE — TELEPHONE ENCOUNTER
Spoke with patient. Informed pt that it would be ok as long as PCP can give her clearance that day. Suggested that she call PCP to see if they may require blood work or any additional testing to be cleared. Patient states she recently had a physical and that she should have recent BW on file with them.

## 2020-11-18 NOTE — PROGRESS NOTES
Transportation needs     Medical: Not on file     Non-medical: Not on file   Tobacco Use    Smoking status: Never Smoker    Smokeless tobacco: Never Used   Substance and Sexual Activity    Alcohol use: Never     Frequency: Never    Drug use: No    Sexual activity: Not on file   Lifestyle    Physical activity     Days per week: Not on file     Minutes per session: Not on file    Stress: Not on file   Relationships    Social connections     Talks on phone: Not on file     Gets together: Not on file     Attends Orthodoxy service: Not on file     Active member of club or organization: Not on file     Attends meetings of clubs or organizations: Not on file     Relationship status: Not on file    Intimate partner violence     Fear of current or ex partner: Not on file     Emotionally abused: Not on file     Physically abused: Not on file     Forced sexual activity: Not on file   Other Topics Concern    Not on file   Social History Narrative    Exercise: swim, running    Seatbelt use: yes    Caffeine intake: one cup coffee daily                   Allergy:   Allergies   Allergen Reactions    Phenergan [Promethazine]      Pt mother had a really bad reaction and was hospitalized pt has always been told to say she could not have it    Baclofen      tremors    Mobic [Meloxicam] Nausea And Vomiting     edema    Nitrous Oxide Nausea And Vomiting       PHYSICAL EXAM:  VITALS:  /71 (Site: Right Upper Arm, Position: Sitting, Cuff Size: Medium Adult)   Pulse 86   Temp 97.5 °F (36.4 °C) (Temporal)   Resp 16   Ht 5' 6\" (1.676 m)   Wt 120 lb 6.4 oz (54.6 kg)   BMI 19.43 kg/m²     CONSTITUTIONAL:  alert, no apparent distress and thin  EYES:  sclera clear  ENT:  normocepalic, without obvious abnormality  NECK:  supple, symmetrical, trachea midline and no carotid bruits  LUNGS:  clear to auscultation  CARDIOVASCULAR:  regular rate and rhythm and no murmur noted  ABDOMEN:  no scars, normal bowel sounds, soft, non-distended, non-tender, voluntary guarding absent, no masses palpated and hernia absent  MUSCULOSKELETAL:  0+ pitting edema lower extremities  NEUROLOGIC:  Mental Status Exam:  Level of Alertness:   awake  Orientation:   person, place, time  SKIN:  no bruising or bleeding and normal skin color, texture, turgor    DATA:    Radiology Review:    Mild gallbladder sludge.         No cause for acute pain identified.         Previously seen hepatic cyst not identified on today's examination. No signs for acute cholecystitis.  Gallbladder ejection fraction of 96%. IMPRESSION/RECOMMENDATIONS:    The patient has findings consistent with bilary colic and sludge as well as a hyperdynamic gallbladder. As a result, we will proceed with laparoscopic cholecystectomy. The risks, benefits, and expected recovery were discussed with the patient and she wishes to proceed.      Mj Alicea

## 2020-11-19 LAB — SARS-COV-2: NOT DETECTED

## 2020-11-23 ENCOUNTER — OFFICE VISIT (OUTPATIENT)
Dept: INTERNAL MEDICINE CLINIC | Age: 30
End: 2020-11-23
Payer: MEDICAID

## 2020-11-23 VITALS
DIASTOLIC BLOOD PRESSURE: 78 MMHG | WEIGHT: 125 LBS | TEMPERATURE: 97.8 F | BODY MASS INDEX: 20.18 KG/M2 | SYSTOLIC BLOOD PRESSURE: 118 MMHG | OXYGEN SATURATION: 98 % | HEART RATE: 70 BPM

## 2020-11-23 PROCEDURE — 99213 OFFICE O/P EST LOW 20 MIN: CPT | Performed by: NURSE PRACTITIONER

## 2020-11-23 ASSESSMENT — ENCOUNTER SYMPTOMS
BACK PAIN: 0
SINUS PAIN: 0
ABDOMINAL PAIN: 1
CONSTIPATION: 0
COUGH: 0
WHEEZING: 0
SHORTNESS OF BREATH: 0
DIARRHEA: 0
SINUS PRESSURE: 0
COLOR CHANGE: 0

## 2020-11-23 ASSESSMENT — PATIENT HEALTH QUESTIONNAIRE - PHQ9
SUM OF ALL RESPONSES TO PHQ QUESTIONS 1-9: 0
2. FEELING DOWN, DEPRESSED OR HOPELESS: 0
DEPRESSION UNABLE TO ASSESS: FUNCTIONAL CAPACITY MOTIVATION LIMITS ACCURACY
SUM OF ALL RESPONSES TO PHQ QUESTIONS 1-9: 0
SUM OF ALL RESPONSES TO PHQ QUESTIONS 1-9: 0
SUM OF ALL RESPONSES TO PHQ9 QUESTIONS 1 & 2: 0
1. LITTLE INTEREST OR PLEASURE IN DOING THINGS: 0

## 2020-11-23 NOTE — PROGRESS NOTES
Lifestyle    Physical activity     Days per week: Not on file     Minutes per session: Not on file    Stress: Not on file   Relationships    Social connections     Talks on phone: Not on file     Gets together: Not on file     Attends Church service: Not on file     Active member of club or organization: Not on file     Attends meetings of clubs or organizations: Not on file     Relationship status: Not on file    Intimate partner violence     Fear of current or ex partner: Not on file     Emotionally abused: Not on file     Physically abused: Not on file     Forced sexual activity: Not on file   Other Topics Concern    Not on file   Social History Narrative    Exercise: swim, running    Seatbelt use: yes    Caffeine intake: one cup coffee daily                   Review of Systems  Review of Systems   Constitutional: Negative for chills, fatigue and fever. HENT: Negative for congestion, sinus pressure and sinus pain. Respiratory: Negative for cough, shortness of breath and wheezing. Cardiovascular: Negative for chest pain and palpitations. Gastrointestinal: Positive for abdominal pain (RUQ). Negative for constipation and diarrhea. Musculoskeletal: Negative for arthralgias, back pain and myalgias. Skin: Negative for color change, pallor and rash. Neurological: Negative for dizziness, syncope, weakness, light-headedness and headaches. Psychiatric/Behavioral: Negative for behavioral problems, confusion and sleep disturbance. The patient is not nervous/anxious. Objective:     Vitals:    11/23/20 1301   BP: 118/78   Pulse: 70   Temp: 97.8 °F (36.6 °C)   SpO2: 98%        Physical Exam  Physical Exam  Constitutional:       Appearance: She is well-developed. HENT:      Head: Normocephalic and atraumatic. Eyes:      Conjunctiva/sclera: Conjunctivae normal.      Pupils: Pupils are equal, round, and reactive to light.    Neck:      Musculoskeletal: Normal range of motion and neck supple. Thyroid: No thyromegaly. Vascular: No JVD. Cardiovascular:      Rate and Rhythm: Normal rate and regular rhythm. Heart sounds: Normal heart sounds. Pulmonary:      Effort: Pulmonary effort is normal. No respiratory distress. Breath sounds: Normal breath sounds. No wheezing. Musculoskeletal: Normal range of motion. General: No deformity. Skin:     General: Skin is warm and dry. Capillary Refill: Capillary refill takes less than 2 seconds. Neurological:      Mental Status: She is alert and oriented to person, place, and time. Psychiatric:         Behavior: Behavior normal.       Lab Review   Lab Results   Component Value Date     04/27/2020     01/14/2020     12/10/2019    K 4.1 04/27/2020    K 4.4 01/14/2020    K 3.8 12/10/2019    K 4.1 11/06/2019    K 4.3 05/23/2019    CO2 30 04/27/2020    CO2 28 01/14/2020    CO2 27 12/10/2019    BUN 11 04/27/2020    BUN 14 01/14/2020    BUN 8 12/10/2019    CREATININE 0.6 04/27/2020    CREATININE 0.6 01/14/2020    CREATININE 0.5 12/10/2019    GLUCOSE 107 04/27/2020    GLUCOSE 94 01/14/2020    GLUCOSE 89 12/10/2019    CALCIUM 9.0 04/27/2020    CALCIUM 9.2 01/14/2020    CALCIUM 9.2 12/10/2019     Lab Results   Component Value Date    WBC 3.7 04/27/2020    HGB 11.8 04/27/2020    HCT 36.1 04/27/2020    MCV 93.7 04/27/2020     04/27/2020         Medication Review  Current Outpatient Medications on File Prior to Visit   Medication Sig Dispense Refill    tretinoin (RETIN-A) 0.025 % cream Apply pea-sized amount to entire face every other night increasing to nightly application as tolerated 45 g 2    Dapsone (ACZONE) 7.5 % GEL Apply thin layer to affected areas on face, neck, chest every morning.  90 g 1    nystatin (MYCOSTATIN) 683036 UNIT/GM cream Apply Twice daily for 1 week to corners of mouth, may use twice daily as needed for flares 30 g 2    Multiple Vitamins-Minerals (THERAPEUTIC MULTIVITAMIN-MINERALS) tablet Take 1 tablet by mouth daily      iron polysaccharides (FERREX 150) 150 MG capsule Take 1 capsule by mouth 2 times daily 60 capsule 3    amitriptyline (ELAVIL) 25 MG tablet Take 1 tablet by mouth nightly 30 tablet 0     No current facility-administered medications on file prior to visit. Assessment:       1. Right sided abdominal pain    2. Biliary dyskinesia    3. Preop examination    4. Other iron deficiency anemia    5. Complex regional pain syndrome type 1 of lower extremity, unspecified laterality         Plan:        Right sided abdominal pain  Surgery as planned per Dr. Jagruti moreira  Surgery as planned    Preop examination  Perioperative risk related to the patient's upcoming surgery is considered low. she is cleared for surgery.   Pre-op exam was completed on 11/23/20 1:20 PM.     Iron deficiency anemia  Stable, controlled   Reviewed recent labs    Complex regional pain syndrome I  Stable, controlled       - Preoperative workup as follows none  - Change in medication regimen before surgery: none, continue med regimen   - Prophylaxisfor cardiac events with perioperative beta-blockers: should be considered, specific regimen per anesthesia

## 2020-11-23 NOTE — ASSESSMENT & PLAN NOTE
Perioperative risk related to the patient's upcoming surgery is considered low. she is cleared for surgery.   Pre-op exam was completed on 11/23/20 1:20 PM.

## 2020-11-25 ENCOUNTER — TELEPHONE (OUTPATIENT)
Dept: SURGERY | Age: 30
End: 2020-11-25

## 2020-11-25 NOTE — TELEPHONE ENCOUNTER
Please call pt to let her know when surgery has been approved with [facility]: [phone]

## 2020-11-25 NOTE — TELEPHONE ENCOUNTER
[facility] called. Pt is scheduled for surgery on 12/03/2020 and this needs a PA. Please call.

## 2020-11-27 ENCOUNTER — NURSE ONLY (OUTPATIENT)
Dept: PRIMARY CARE CLINIC | Age: 30
End: 2020-11-27
Payer: MEDICAID

## 2020-11-27 PROCEDURE — 99211 OFF/OP EST MAY X REQ PHY/QHP: CPT | Performed by: NURSE PRACTITIONER

## 2020-11-28 LAB — SARS-COV-2: NOT DETECTED

## 2020-11-30 ENCOUNTER — TELEPHONE (OUTPATIENT)
Dept: SURGERY | Age: 30
End: 2020-11-30

## 2020-11-30 ENCOUNTER — ANESTHESIA EVENT (OUTPATIENT)
Dept: OPERATING ROOM | Age: 30
End: 2020-11-30
Payer: MEDICAID

## 2020-11-30 NOTE — TELEPHONE ENCOUNTER
Spoke with patient, surgery with Dr Mahi Ku will need to be rescheduled. Patient was upset and stated she was in too much pain to have her surgery rescheduled for a 3rd time. Patient stated she wanted another surgeon because she was not willing to wait another 2 weeks before she could get in. Patient now scheduled with Dr. Hyacinth Juan on 12/1/20.

## 2020-11-30 NOTE — PROGRESS NOTES
[facility] / [facility]) [address], [address]    Acknowledgment of Informed Consent for Surgical or Medical Procedure and Sedation  I agree to allow doctor(s) Zane Alaniz and his/her associates or assistants, including residents and/or other qualified medical practitioner to perform the following medical treatment or procedure and to administer or direct the administration of sedation as necessary:  Procedure(s): LAPAROSCOPIC CHOLECYSTECTOMY WITH CHOLANGIOGRAM  My doctor has explained the following regarding the proposed procedure:   the explanation of the procedure   the benefits of the procedure   the potential problems that might occur during recuperation   the risks and side effects of the procedure which could include but are not limited to severe blood loss, infection, stroke or death   the benefits, risks and side effect of alternative procedures including the consequences of declining this procedure or any alternative procedures   the likelihood of achieving satisfactory results. I acknowledge no guarantee or assurance has been made to me regarding the results. I understand that during the course of this treatment/procedure, unforeseen conditions can occur which require an additional or different procedure. I agree to allow my physician or assistants to perform such extension of the original procedure as they may find necessary. I understand that sedation will often result in temporary impairment of memory and fine motor skills and that sedation can occasionally progress to a state of deep sedation or general anesthesia. I understand the risks of anesthesia for surgery include, but are not limited to, sore throat, hoarseness, injury to face, mouth, or teeth; nausea; headache; injury to blood vessels or nerves; death, brain damage, or paralysis.     I understand that if I have a Limitation of Treatment order in effect during my hospitalization, the order may or may not be in effect during this procedure. I give my doctor permission to give me blood or blood products. I understand that there are risks with receiving blood such as hepatitis, AIDS, fever, or allergic reaction. I acknowledge that the risks, benefits, and alternatives of this treatment have been explained to me and that no express or implied warranty has been given by the hospital, any blood bank, or any person or entity as to the blood or blood components transfused. At the discretion of my doctor, I agree to allow observers, equipment/product representatives and allow photographing, and/or televising of the procedure, provided my name or identity is maintained confidentially. I agree the hospital may dispose of or use for scientific or educational purposes any tissue, fluid, or body parts which may be removed.     ________________________________Date________Time______ am/pm  ([place] [facility])  Patient or Signature of Closest Relative or Legal Guardian    ________________________________Date________Time______am/pm      Page 1 of  1  Witness

## 2020-11-30 NOTE — PROGRESS NOTES
[facility] patients having surgery or anesthesia are required to be Covid tested. You will need to quarantined from the time you are tested until your surgery. PRIOR TO PROCEDURE DATE:  1. Please follow any guidelines/instructions prior to your procedure as advised by your surgeon. 2. Arrange for someone to drive you home and be with you for the first 24 hours after discharge for your safety after your procedure for which you received sedation. Ensure it is someone we can share information with regarding your discharge. 3. You must contact your surgeon for instructions IF:   You are taking any blood thinners, aspirin, anti-inflammatory or vitamin E.   There is a change in your physical condition such as a cold, fever, rash, cuts, sores or any other infection, especially near your surgical site. 4. Do not drink alcohol the day before or day of your procedure. 5. A Pre-op History and Physical for surgery MUST be completed by your Physician or Urgent Care within 30 days of your procedure date. Please bring a copy with you on the day of your procedure and along with any other testing performed. THE DAY OF YOUR PROCEDURE:  1. Follow instructions for ARRIVAL TIME as DIRECTED BY YOUR SURGEON. I    2. Enter the MAIN entrance from [facility] and follow the signs to the free [employer] or [employer] parking (offered free of charge 6am-5pm). 3. Enter the Main Entrance of the hospital (do not enter from the lower level of the parking garage). Upon entrance, check in with the  at the main desk on your left. If no one is available at the desk, proceed into the [facility] Waiting Room and go through the door directly into the [facility]. There is a Check-in desk ACROSS from Room 5 (marked with a sign hanging from the ceiling). The phone number for the surgery center is [phone].     4. Please call [phone] option #2 option #2 if you have not been preregistered yet. On the day of your procedure bring your insurance card and photo ID. You will be registered at your bedside once brought back to your room. 5. DO NOT EAT ANYTHING eight hours prior to surgery. May have 8 ounces of water 4 hours prior to surgery. 6. MEDICATIONS    Take the following medications with a SMALL sip of water:    Use your usual dose of inhalers the morning of surgery. BRING your rescue inhaler with you to hospital.    Anesthesia does NOT want you to take insulin the morning of surgery. They will control your blood sugar while you are at the hospital. Please contact your ordering physician for instructions regarding your insulin the night before your procedure. If you have an insulin pump, please keep it set on basal rate. 7. Do not swallow water when brushing teeth. No gum, candy, mints or ice chips. Refrain from smoking or at least decrease the amount. 8. Dress in loose, comfortable clothing appropriate for redressing after your procedure. Do not wear jewelry (including body piercings), make-up (especially NO eye make-up), fingernail polish (NO toenail polish if foot/leg surgery), lotion, powders or metal hairclips. 9. Dentures, glasses, or contacts will need to be removed before your procedure. Bring cases for your glasses, contacts, dentures, or hearing aids to protect them while you are in surgery. 10. If you use a CPAP, please bring it with you on the day of your procedure. 11. We recommend that valuable personal  belongings such as cash, cell phones, e-tablets or jewelry, be left at home during your stay. The hospital will not be responsible for valuables that are not secured in the hospital safe. However, if your insurance requires a co-pay, you may want to bring a method of payment, i.e. Check or credit card, if you wish to pay your co-pay the day of surgery.       12. If you are to your vital signs or breathing patterns. Nausea, headache, muscle aches, or sore throat may also occur after anesthesia. Your nurse will help you manage these potential side effects. 2. For comfort and safety, arrange to have someone at home with you for the first 24 hours after discharge. 3. You and your family will be given written instructions about your diet, activity, dressing care, medications, and return visits. 4. Once at home, should issues with nausea, pain, or bleeding occur, or should you notice any signs of infection, you should call your surgeon. 5. Always clean your hands before and after caring for your wound. Do not let your family touch your surgery site without cleaning their hands. 6. Narcotic pain medications can cause significant constipation. You may want to add a stool softener to your postoperative medication schedule or speak to your surgeon on how best to manage this SIDE EFFECT. SPECIAL INSTRUCTIONS     Thank you for allowing us to care for you. We strive to exceed your expectations in the delivery of care and service provided to you and your family. If you need to contact us for any reason, please call us at [phone]    Instructions reviewed with patient during preadmission testing phone interview. Mike Villalobos. 11/30/2020 .4:07 PM      ADDITIONAL EDUCATIONAL INFORMATION REVIEWED PER PHONE WITH YOU AND/OR YOUR FAMILY:  Yes Bring a urine sample on day of surgery  No Hibiclens® Bathing Instructions   Yes Antibacterial Soap- HIBICLENS PER SURGEON

## 2020-11-30 NOTE — DISCHARGE INSTR - COC
Continuity of Care Form    Patient Name: Mark Meredith   :  1990  MRN:  [id number]    Admit date:  (Not on file)  Discharge date:  ***    Code Status Order: No Order   Advance Directives:   [address] [place] [address] Documentation     Date/Time Healthcare Directive Type of Healthcare Directive Copy in [address] Agent's Name Healthcare Agent's Phone Number    20 1603  No, patient does not have an advance directive for healthcare treatment -- -- -- -- --          Admitting Physician:  Hai Prakash MD  PCP: ANAND Damon - CNP    Discharging Nurse: [facility] Unit/Room#: No information available for this encounter. Discharging Unit Phone Number: ***    Emergency Contact:   Extended Emergency Contact Information  Primary Emergency Contact: [address] Phone: [phone]  Mobile Phone: [phone]  Relation: Parent    Past Surgical History:  Past Surgical History:   Procedure Laterality Date    BREAST SURGERY Left 2019    LEFT BREAST EXCISIONAL BIOPSY performed by Sarah Weiss MD at / [facility] 88 N/A 2020    SPINAL CORD NERVE STIMULATOR IMPLANT (ADDITION OF 2 CERVICAL LEADS) - [employer] performed by Guru Eubanks MD at [address] [place] [address]      Spinal stimulator    WISDOM TOOTH EXTRACTION         Immunization History:   Immunization History   Administered Date(s) Administered    MMR 2015    Measles/Rubella 2015    Polio IPV (IPOL) 2015    Td, unspecified formulation 12/15/2015    Yellow Fever (YF-Vax) 2015       Active Problems:  Patient Active Problem List   Diagnosis Code    Iron deficiency anemia D50.9    Complex regional pain syndrome I G90.50    Ocular migraine G43. 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    Preop examination Z01.818    Right sided abdominal pain R10.9    RUQ pain R10.11    Biliary dyskinesia K82.8       Isolation/Infection:   Isolation          No Isolation        Patient Infection Status     None to display          Nurse Assessment:  Last Vital Signs: Ht 5' 6\" (1.676 m)   Wt 125 lb (56.7 kg)   LMP 10/31/2020   BMI 20.18 kg/m²     Last documented pain score (0-10 scale):    Last Weight:   Wt Readings from Last 1 Encounters:   11/30/20 125 lb (56.7 kg)     Mental Status:  {IP PT MENTAL STATUS:13949}    IV Access:  { ALISON IV ACCESS:092520209}    Nursing Mobility/ADLs:  Walking   {CHP DME RHKJ:660975599}  Transfer  {CHP DME IZHS:358889230}  Bathing  {CHP DME ZLCM:285011724}  Dressing  {CHP DME HKLN:374521433}  Toileting  {CHP DME WQNM:893901356}  Feeding  {P DME PZGY:353799490}  Med Admin  {[facility] DME LEKH:631335192}  Med Delivery   { ALISON MED Delivery:509121562}    Wound Care Documentation and Therapy:        Elimination:  Continence:   · Bowel: {YES / OC:34554}  · Bladder: {YES / IE:48872}  Urinary Catheter: {Urinary Catheter:478601179}   Colostomy/Ileostomy/Ileal Conduit: {YES / EV:95233}       Date of Last BM: ***  No intake or output data in the 24 hours ending 11/30/20 1612  No intake/output data recorded.     Safety Concerns:     [email or web address] Safety Concerns:505419490}    Impairments/Disabilities:      [email or web address] Impairments/Disabilities:646673068}    Nutrition Therapy:  Current Nutrition Therapy:   [email or web address] Diet List:837026685}    Routes of Feeding: {[facility] DME Other Feedings:208019498}  Liquids: {Slp liquid thickness:01436}  Daily Fluid Restriction: {CHP DME Yes amt example:174458966}  Last Modified Barium Swallow with Video (Video Swallowing Test): {Done Not Done WOSK:181055222}    Treatments at the Time of Hospital Discharge:   Respiratory Treatments: ***  Oxygen Therapy:  {Therapy; copd oxygen:73418}  Ventilator:    {MH CC Vent UTFP:447024870}    Rehab Therapies: {THERAPEUTIC INTERVENTION:3394733816}  Weight Bearing Status/Restrictions: {CAS CC Weight Bearin}  Other Medical Equipment (for information only, NOT a DME order):  {EQUIPMENT:594625054}  Other Treatments: ***    Patient's personal belongings (please select all that are sent with patient):  {CHP DME Belongings:914744650}    RN SIGNATURE:  {Esignature:149433591}    CASE MANAGEMENT/SOCIAL WORK SECTION    Inpatient Status Date: ***    Readmission Risk Assessment Score:  Readmission Risk              Risk of Unplanned Readmission:        0           Discharging to Facility/ Agency   · Name:   · Address:  · Phone:  · Fax:    Dialysis Facility (if applicable)   · Name:  · Address:  · Dialysis Schedule:  · Phone:  · Fax:    / signature: {Esignature:681878959}    PHYSICIAN SECTION    Prognosis: {Prognosis:9834102429}    Condition at Discharge: [address] [place] [address] Patient Condition:948881029}    Rehab Potential (if transferring to Rehab): {Prognosis:5289030237}    Recommended Labs or Other Treatments After Discharge: ***    Physician Certification: I certify the above information and transfer of Brooke Shape  is necessary for the continuing treatment of the diagnosis listed and that she requires {Admit to Appropriate Level of Care:49127} for {GREATER/LESS:509566217} 30 days.      Update Admission H&P: {CHP DME Changes in NNTSE:810225801}    PHYSICIAN SIGNATURE:  {Esignature:796383059}

## 2020-11-30 NOTE — PROGRESS NOTES
Patient will stay with parents after surgery and go home DOS.     Patient has quarantined since Covid test

## 2020-12-01 ENCOUNTER — HOSPITAL ENCOUNTER (OUTPATIENT)
Age: 30
Setting detail: OUTPATIENT SURGERY
Discharge: HOME | End: 2020-12-01
Attending: SURGERY | Admitting: SURGERY
Payer: MEDICAID

## 2020-12-01 ENCOUNTER — ANESTHESIA (OUTPATIENT)
Dept: OPERATING ROOM | Age: 30
End: 2020-12-01
Payer: MEDICAID

## 2020-12-01 VITALS
SYSTOLIC BLOOD PRESSURE: 97 MMHG | WEIGHT: 125 LBS | TEMPERATURE: 97.6 F | HEART RATE: 98 BPM | DIASTOLIC BLOOD PRESSURE: 63 MMHG | HEIGHT: 66 IN | RESPIRATION RATE: 16 BRPM | OXYGEN SATURATION: 99 % | BODY MASS INDEX: 20.09 KG/M2

## 2020-12-01 VITALS — SYSTOLIC BLOOD PRESSURE: 95 MMHG | TEMPERATURE: 97.7 F | DIASTOLIC BLOOD PRESSURE: 58 MMHG | OXYGEN SATURATION: 99 %

## 2020-12-01 LAB — PREGNANCY, URINE: NEGATIVE

## 2020-12-01 PROCEDURE — 2580000003 HC RX 258: Performed by: ANESTHESIOLOGY

## 2020-12-01 PROCEDURE — 7100000011 HC PHASE II RECOVERY - ADDTL 15 MIN: Performed by: SURGERY

## 2020-12-01 PROCEDURE — 7100000000 HC PACU RECOVERY - FIRST 15 MIN: Performed by: SURGERY

## 2020-12-01 PROCEDURE — 6360000002 HC RX W HCPCS: Performed by: NURSE ANESTHETIST, CERTIFIED REGISTERED

## 2020-12-01 PROCEDURE — 6360000002 HC RX W HCPCS

## 2020-12-01 PROCEDURE — 47562 LAPAROSCOPIC CHOLECYSTECTOMY: CPT | Performed by: SURGERY

## 2020-12-01 PROCEDURE — 7100000001 HC PACU RECOVERY - ADDTL 15 MIN: Performed by: SURGERY

## 2020-12-01 PROCEDURE — 6360000002 HC RX W HCPCS: Performed by: ANESTHESIOLOGY

## 2020-12-01 PROCEDURE — 3600000004 HC SURGERY LEVEL 4 BASE: Performed by: SURGERY

## 2020-12-01 PROCEDURE — 6370000000 HC RX 637 (ALT 250 FOR IP): Performed by: ANESTHESIOLOGY

## 2020-12-01 PROCEDURE — 2580000003 HC RX 258: Performed by: SURGERY

## 2020-12-01 PROCEDURE — 2500000003 HC RX 250 WO HCPCS: Performed by: NURSE ANESTHETIST, CERTIFIED REGISTERED

## 2020-12-01 PROCEDURE — 84703 CHORIONIC GONADOTROPIN ASSAY: CPT

## 2020-12-01 PROCEDURE — 7100000010 HC PHASE II RECOVERY - FIRST 15 MIN: Performed by: SURGERY

## 2020-12-01 PROCEDURE — 3700000001 HC ADD 15 MINUTES (ANESTHESIA): Performed by: SURGERY

## 2020-12-01 PROCEDURE — 88304 TISSUE EXAM BY PATHOLOGIST: CPT

## 2020-12-01 PROCEDURE — 2500000003 HC RX 250 WO HCPCS: Performed by: SURGERY

## 2020-12-01 PROCEDURE — 2709999900 HC NON-CHARGEABLE SUPPLY: Performed by: SURGERY

## 2020-12-01 PROCEDURE — 3700000000 HC ANESTHESIA ATTENDED CARE: Performed by: SURGERY

## 2020-12-01 PROCEDURE — 3600000014 HC SURGERY LEVEL 4 ADDTL 15MIN: Performed by: SURGERY

## 2020-12-01 PROCEDURE — 6360000002 HC RX W HCPCS: Performed by: SURGERY

## 2020-12-01 PROCEDURE — 2720000010 HC SURG SUPPLY STERILE: Performed by: SURGERY

## 2020-12-01 RX ORDER — LABETALOL 20 MG/4 ML (5 MG/ML) INTRAVENOUS SYRINGE
5 EVERY 10 MIN PRN
Status: DISCONTINUED | OUTPATIENT
Start: 2020-12-01 | End: 2020-12-01 | Stop reason: HOSPADM

## 2020-12-01 RX ORDER — DOCUSATE SODIUM 100 MG/1
100 CAPSULE, LIQUID FILLED ORAL 2 TIMES DAILY PRN
Qty: 60 CAPSULE | Refills: 0 | Status: SHIPPED | OUTPATIENT
Start: 2020-12-01 | End: 2020-12-16 | Stop reason: CLARIF

## 2020-12-01 RX ORDER — KETOROLAC TROMETHAMINE 30 MG/ML
INJECTION, SOLUTION INTRAMUSCULAR; INTRAVENOUS PRN
Status: DISCONTINUED | OUTPATIENT
Start: 2020-12-01 | End: 2020-12-01 | Stop reason: SDUPTHER

## 2020-12-01 RX ORDER — ONDANSETRON 2 MG/ML
4 INJECTION INTRAMUSCULAR; INTRAVENOUS
Status: COMPLETED | OUTPATIENT
Start: 2020-12-01 | End: 2020-12-01

## 2020-12-01 RX ORDER — APREPITANT 40 MG/1
80 CAPSULE ORAL ONCE
Status: COMPLETED | OUTPATIENT
Start: 2020-12-01 | End: 2020-12-01

## 2020-12-01 RX ORDER — ONDANSETRON 2 MG/ML
4 INJECTION INTRAMUSCULAR; INTRAVENOUS ONCE
Status: COMPLETED | OUTPATIENT
Start: 2020-12-01 | End: 2020-12-01

## 2020-12-01 RX ORDER — FENTANYL CITRATE 50 UG/ML
50 INJECTION, SOLUTION INTRAMUSCULAR; INTRAVENOUS EVERY 5 MIN PRN
Status: DISCONTINUED | OUTPATIENT
Start: 2020-12-01 | End: 2020-12-01 | Stop reason: HOSPADM

## 2020-12-01 RX ORDER — LIDOCAINE HYDROCHLORIDE 20 MG/ML
INJECTION, SOLUTION INTRAVENOUS PRN
Status: DISCONTINUED | OUTPATIENT
Start: 2020-12-01 | End: 2020-12-01 | Stop reason: SDUPTHER

## 2020-12-01 RX ORDER — SODIUM CHLORIDE, SODIUM LACTATE, POTASSIUM CHLORIDE, AND CALCIUM CHLORIDE .6; .31; .03; .02 G/100ML; G/100ML; G/100ML; G/100ML
IRRIGANT IRRIGATION PRN
Status: DISCONTINUED | OUTPATIENT
Start: 2020-12-01 | End: 2020-12-01 | Stop reason: ALTCHOICE

## 2020-12-01 RX ORDER — METOCLOPRAMIDE HYDROCHLORIDE 5 MG/ML
INJECTION INTRAMUSCULAR; INTRAVENOUS PRN
Status: DISCONTINUED | OUTPATIENT
Start: 2020-12-01 | End: 2020-12-01 | Stop reason: SDUPTHER

## 2020-12-01 RX ORDER — OXYCODONE HYDROCHLORIDE AND ACETAMINOPHEN 5; 325 MG/1; MG/1
1 TABLET ORAL EVERY 6 HOURS PRN
Qty: 16 TABLET | Refills: 0 | Status: SHIPPED | OUTPATIENT
Start: 2020-12-01 | End: 2020-12-05

## 2020-12-01 RX ORDER — DEXAMETHASONE SODIUM PHOSPHATE 4 MG/ML
4 INJECTION, SOLUTION INTRA-ARTICULAR; INTRALESIONAL; INTRAMUSCULAR; INTRAVENOUS; SOFT TISSUE ONCE
Status: COMPLETED | OUTPATIENT
Start: 2020-12-01 | End: 2020-12-01

## 2020-12-01 RX ORDER — LIDOCAINE HYDROCHLORIDE 10 MG/ML
1 INJECTION, SOLUTION EPIDURAL; INFILTRATION; INTRACAUDAL; PERINEURAL
Status: DISCONTINUED | OUTPATIENT
Start: 2020-12-01 | End: 2020-12-01 | Stop reason: HOSPADM

## 2020-12-01 RX ORDER — BUPIVACAINE HYDROCHLORIDE 5 MG/ML
INJECTION, SOLUTION EPIDURAL; INTRACAUDAL PRN
Status: DISCONTINUED | OUTPATIENT
Start: 2020-12-01 | End: 2020-12-01 | Stop reason: ALTCHOICE

## 2020-12-01 RX ORDER — PROPOFOL 10 MG/ML
INJECTION, EMULSION INTRAVENOUS PRN
Status: DISCONTINUED | OUTPATIENT
Start: 2020-12-01 | End: 2020-12-01 | Stop reason: SDUPTHER

## 2020-12-01 RX ORDER — HYDRALAZINE HYDROCHLORIDE 20 MG/ML
5 INJECTION INTRAMUSCULAR; INTRAVENOUS EVERY 5 MIN PRN
Status: DISCONTINUED | OUTPATIENT
Start: 2020-12-01 | End: 2020-12-01 | Stop reason: HOSPADM

## 2020-12-01 RX ORDER — KETAMINE HYDROCHLORIDE 50 MG/ML
INJECTION, SOLUTION, CONCENTRATE INTRAMUSCULAR; INTRAVENOUS PRN
Status: DISCONTINUED | OUTPATIENT
Start: 2020-12-01 | End: 2020-12-01 | Stop reason: SDUPTHER

## 2020-12-01 RX ORDER — FENTANYL CITRATE 50 UG/ML
25 INJECTION, SOLUTION INTRAMUSCULAR; INTRAVENOUS EVERY 5 MIN PRN
Status: DISCONTINUED | OUTPATIENT
Start: 2020-12-01 | End: 2020-12-01 | Stop reason: HOSPADM

## 2020-12-01 RX ORDER — SODIUM CHLORIDE, SODIUM LACTATE, POTASSIUM CHLORIDE, CALCIUM CHLORIDE 600; 310; 30; 20 MG/100ML; MG/100ML; MG/100ML; MG/100ML
INJECTION, SOLUTION INTRAVENOUS CONTINUOUS
Status: DISCONTINUED | OUTPATIENT
Start: 2020-12-01 | End: 2020-12-01 | Stop reason: HOSPADM

## 2020-12-01 RX ORDER — SODIUM CHLORIDE 0.9 % (FLUSH) 0.9 %
10 SYRINGE (ML) INJECTION PRN
Status: DISCONTINUED | OUTPATIENT
Start: 2020-12-01 | End: 2020-12-01 | Stop reason: HOSPADM

## 2020-12-01 RX ORDER — DEXAMETHASONE SODIUM PHOSPHATE 4 MG/ML
INJECTION, SOLUTION INTRA-ARTICULAR; INTRALESIONAL; INTRAMUSCULAR; INTRAVENOUS; SOFT TISSUE PRN
Status: DISCONTINUED | OUTPATIENT
Start: 2020-12-01 | End: 2020-12-01 | Stop reason: SDUPTHER

## 2020-12-01 RX ORDER — SCOLOPAMINE TRANSDERMAL SYSTEM 1 MG/1
1 PATCH, EXTENDED RELEASE TRANSDERMAL ONCE
Status: DISCONTINUED | OUTPATIENT
Start: 2020-12-01 | End: 2020-12-01 | Stop reason: HOSPADM

## 2020-12-01 RX ORDER — ONDANSETRON 2 MG/ML
INJECTION INTRAMUSCULAR; INTRAVENOUS
Status: COMPLETED
Start: 2020-12-01 | End: 2020-12-01

## 2020-12-01 RX ORDER — FENTANYL CITRATE 50 UG/ML
INJECTION, SOLUTION INTRAMUSCULAR; INTRAVENOUS PRN
Status: DISCONTINUED | OUTPATIENT
Start: 2020-12-01 | End: 2020-12-01 | Stop reason: SDUPTHER

## 2020-12-01 RX ORDER — ONDANSETRON 2 MG/ML
INJECTION INTRAMUSCULAR; INTRAVENOUS PRN
Status: DISCONTINUED | OUTPATIENT
Start: 2020-12-01 | End: 2020-12-01 | Stop reason: SDUPTHER

## 2020-12-01 RX ORDER — SODIUM CHLORIDE 0.9 % (FLUSH) 0.9 %
10 SYRINGE (ML) INJECTION EVERY 12 HOURS SCHEDULED
Status: DISCONTINUED | OUTPATIENT
Start: 2020-12-01 | End: 2020-12-01 | Stop reason: HOSPADM

## 2020-12-01 RX ORDER — ENALAPRILAT 2.5 MG/2ML
1.25 INJECTION INTRAVENOUS
Status: DISCONTINUED | OUTPATIENT
Start: 2020-12-01 | End: 2020-12-01 | Stop reason: HOSPADM

## 2020-12-01 RX ORDER — CEFAZOLIN SODIUM 2 G/50ML
2 SOLUTION INTRAVENOUS ONCE
Status: COMPLETED | OUTPATIENT
Start: 2020-12-01 | End: 2020-12-01

## 2020-12-01 RX ORDER — ROCURONIUM BROMIDE 10 MG/ML
INJECTION, SOLUTION INTRAVENOUS PRN
Status: DISCONTINUED | OUTPATIENT
Start: 2020-12-01 | End: 2020-12-01 | Stop reason: SDUPTHER

## 2020-12-01 RX ORDER — MAGNESIUM HYDROXIDE 1200 MG/15ML
LIQUID ORAL CONTINUOUS PRN
Status: COMPLETED | OUTPATIENT
Start: 2020-12-01 | End: 2020-12-01

## 2020-12-01 RX ORDER — MIDAZOLAM HYDROCHLORIDE 1 MG/ML
INJECTION INTRAMUSCULAR; INTRAVENOUS PRN
Status: DISCONTINUED | OUTPATIENT
Start: 2020-12-01 | End: 2020-12-01 | Stop reason: SDUPTHER

## 2020-12-01 RX ADMIN — KETOROLAC TROMETHAMINE 30 MG: 30 INJECTION, SOLUTION INTRAMUSCULAR; INTRAVENOUS at 10:48

## 2020-12-01 RX ADMIN — ONDANSETRON 4 MG: 2 INJECTION INTRAMUSCULAR; INTRAVENOUS at 10:28

## 2020-12-01 RX ADMIN — LIDOCAINE HYDROCHLORIDE 50 MG: 20 INJECTION, SOLUTION INTRAVENOUS at 10:48

## 2020-12-01 RX ADMIN — FENTANYL CITRATE 200 MCG: 50 INJECTION INTRAMUSCULAR; INTRAVENOUS at 10:28

## 2020-12-01 RX ADMIN — KETAMINE HYDROCHLORIDE 30 MG: 50 INJECTION, SOLUTION INTRAMUSCULAR; INTRAVENOUS at 10:28

## 2020-12-01 RX ADMIN — FAMOTIDINE 20 MG: 10 INJECTION, SOLUTION INTRAVENOUS at 10:48

## 2020-12-01 RX ADMIN — PROPOFOL 200 MG: 10 INJECTION, EMULSION INTRAVENOUS at 10:28

## 2020-12-01 RX ADMIN — MIDAZOLAM HYDROCHLORIDE 2 MG: 2 INJECTION, SOLUTION INTRAMUSCULAR; INTRAVENOUS at 10:23

## 2020-12-01 RX ADMIN — ONDANSETRON 4 MG: 2 INJECTION INTRAMUSCULAR; INTRAVENOUS at 09:48

## 2020-12-01 RX ADMIN — HYDROMORPHONE HYDROCHLORIDE 0.5 MG: 1 INJECTION, SOLUTION INTRAMUSCULAR; INTRAVENOUS; SUBCUTANEOUS at 13:06

## 2020-12-01 RX ADMIN — ONDANSETRON 4 MG: 2 INJECTION INTRAMUSCULAR; INTRAVENOUS at 12:38

## 2020-12-01 RX ADMIN — SODIUM CHLORIDE, SODIUM LACTATE, POTASSIUM CHLORIDE, AND CALCIUM CHLORIDE: 600; 310; 30; 20 INJECTION, SOLUTION INTRAVENOUS at 09:38

## 2020-12-01 RX ADMIN — CEFAZOLIN SODIUM 2 G: 2 SOLUTION INTRAVENOUS at 10:40

## 2020-12-01 RX ADMIN — SODIUM CHLORIDE, SODIUM LACTATE, POTASSIUM CHLORIDE, AND CALCIUM CHLORIDE: 600; 310; 30; 20 INJECTION, SOLUTION INTRAVENOUS at 11:10

## 2020-12-01 RX ADMIN — DEXAMETHASONE SODIUM PHOSPHATE 4 MG: 4 INJECTION, SOLUTION INTRAMUSCULAR; INTRAVENOUS at 09:48

## 2020-12-01 RX ADMIN — METOCLOPRAMIDE 10 MG: 5 INJECTION, SOLUTION INTRAMUSCULAR; INTRAVENOUS at 10:48

## 2020-12-01 RX ADMIN — DEXAMETHASONE SODIUM PHOSPHATE 8 MG: 4 INJECTION, SOLUTION INTRAMUSCULAR; INTRAVENOUS at 10:28

## 2020-12-01 RX ADMIN — APREPITANT 80 MG: 40 CAPSULE ORAL at 09:48

## 2020-12-01 RX ADMIN — LIDOCAINE HYDROCHLORIDE 70 MG: 20 INJECTION, SOLUTION INTRAVENOUS at 10:28

## 2020-12-01 RX ADMIN — ROCURONIUM BROMIDE 40 MG: 10 INJECTION INTRAVENOUS at 10:28

## 2020-12-01 RX ADMIN — SUGAMMADEX 200 MG: 100 INJECTION, SOLUTION INTRAVENOUS at 12:04

## 2020-12-01 RX ADMIN — KETAMINE HYDROCHLORIDE 20 MG: 50 INJECTION, SOLUTION INTRAMUSCULAR; INTRAVENOUS at 10:43

## 2020-12-01 ASSESSMENT — PAIN DESCRIPTION - DESCRIPTORS
DESCRIPTORS: BURNING
DESCRIPTORS: DULL
DESCRIPTORS: PRESSURE

## 2020-12-01 ASSESSMENT — PULMONARY FUNCTION TESTS
PIF_VALUE: 13
PIF_VALUE: 19
PIF_VALUE: 19
PIF_VALUE: 2
PIF_VALUE: 19
PIF_VALUE: 21
PIF_VALUE: 20
PIF_VALUE: 7
PIF_VALUE: 13
PIF_VALUE: 13
PIF_VALUE: 14
PIF_VALUE: 19
PIF_VALUE: 13
PIF_VALUE: 19
PIF_VALUE: 13
PIF_VALUE: 20
PIF_VALUE: 13
PIF_VALUE: 19
PIF_VALUE: 19
PIF_VALUE: 1
PIF_VALUE: 15
PIF_VALUE: 12
PIF_VALUE: 19
PIF_VALUE: 19
PIF_VALUE: 12
PIF_VALUE: 15
PIF_VALUE: 13
PIF_VALUE: 21
PIF_VALUE: 15
PIF_VALUE: 20
PIF_VALUE: 19
PIF_VALUE: 13
PIF_VALUE: 12
PIF_VALUE: 13
PIF_VALUE: 19
PIF_VALUE: 19
PIF_VALUE: 12
PIF_VALUE: 13
PIF_VALUE: 20
PIF_VALUE: 15
PIF_VALUE: 19
PIF_VALUE: 31
PIF_VALUE: 19
PIF_VALUE: 13
PIF_VALUE: 15
PIF_VALUE: 19
PIF_VALUE: 20
PIF_VALUE: 12
PIF_VALUE: 13
PIF_VALUE: 20
PIF_VALUE: 20
PIF_VALUE: 13
PIF_VALUE: 19
PIF_VALUE: 13
PIF_VALUE: 19
PIF_VALUE: 19
PIF_VALUE: 20
PIF_VALUE: 13
PIF_VALUE: 1
PIF_VALUE: 19
PIF_VALUE: 20
PIF_VALUE: 12
PIF_VALUE: 10
PIF_VALUE: 19
PIF_VALUE: 19
PIF_VALUE: 15
PIF_VALUE: 13
PIF_VALUE: 1
PIF_VALUE: 20
PIF_VALUE: 14
PIF_VALUE: 19
PIF_VALUE: 14
PIF_VALUE: 15
PIF_VALUE: 19
PIF_VALUE: 0
PIF_VALUE: 19
PIF_VALUE: 12
PIF_VALUE: 20
PIF_VALUE: 20
PIF_VALUE: 12
PIF_VALUE: 16
PIF_VALUE: 19
PIF_VALUE: 20
PIF_VALUE: 19
PIF_VALUE: 13
PIF_VALUE: 19
PIF_VALUE: 19
PIF_VALUE: 12
PIF_VALUE: 13
PIF_VALUE: 1
PIF_VALUE: 19
PIF_VALUE: 19
PIF_VALUE: 18
PIF_VALUE: 22
PIF_VALUE: 20
PIF_VALUE: 19
PIF_VALUE: 12
PIF_VALUE: 13

## 2020-12-01 ASSESSMENT — PAIN SCALES - GENERAL
PAINLEVEL_OUTOF10: 8
PAINLEVEL_OUTOF10: 4
PAINLEVEL_OUTOF10: 2

## 2020-12-01 ASSESSMENT — PAIN DESCRIPTION - ONSET: ONSET: ON-GOING

## 2020-12-01 ASSESSMENT — PAIN DESCRIPTION - PAIN TYPE: TYPE: ACUTE PAIN;SURGICAL PAIN

## 2020-12-01 ASSESSMENT — PAIN DESCRIPTION - LOCATION: LOCATION: ABDOMEN

## 2020-12-01 ASSESSMENT — PAIN DESCRIPTION - PROGRESSION: CLINICAL_PROGRESSION: GRADUALLY IMPROVING

## 2020-12-01 ASSESSMENT — PAIN DESCRIPTION - ORIENTATION: ORIENTATION: MID;UPPER

## 2020-12-01 ASSESSMENT — PAIN - FUNCTIONAL ASSESSMENT: PAIN_FUNCTIONAL_ASSESSMENT: 0-10

## 2020-12-01 ASSESSMENT — PAIN DESCRIPTION - FREQUENCY: FREQUENCY: CONTINUOUS

## 2020-12-01 NOTE — PROGRESS NOTES
Current Allergies: Phenergan [promethazine]; Baclofen; Mobic [meloxicam]; Nitrous oxide; and Nucynta er [tapentadol hcl er]      Admitted to PACU bed 2 from OR. Arrived on a stretcher . Attached to PACU monitoring system. Alarms and parameters set. Report received from anesthesia personnel. OR staff did not report skin issues that were observed while in OR  No problems reported intraoperatively. Pt arrived on room air  Athrombic wraps in place.

## 2020-12-01 NOTE — PROGRESS NOTES
PACU Transfer to [facility]  Pt's Current Allergies: Phenergan [promethazine]; Baclofen; Mobic [meloxicam]; Nitrous oxide; and Nucynta er [tapentadol hcl er]    Pt meets criteria to transfer to next phase of care per [facility]IO SCORE and ASPAN standards      Vitals:    12/01/20 1345   BP: 103/64   Pulse: 104   Resp: 17   Temp: 97.4 °F (36.3 °C)   SpO2: 100%         Intake/Output Summary (Last 24 hours) at 12/1/2020 1402  Last data filed at 12/1/2020 1345  Gross per 24 hour   Intake 1625 ml   Output --   Net 1625 ml       Pain assessment:  present - adequately treated  Pain Level: 4    Patient was assessed for unknown alterations to skin integrity. There were not unknown alterations observed. Patient transferred to care of Rolando Mckeon RN.   Family updated and directed to Rolando Mckeon    12/1/2020 2:02 PM

## 2020-12-01 NOTE — OP NOTE
Operative Note      Patient: Mason Ribeiro  YOB: 1990  MRN: [id number]    Date of Procedure: 12/1/2020    Pre-Op Diagnosis: Biliary dyskinesia [K82.8]    Post-Op Diagnosis: Same, chronic cholecystitis    Procedure(s):  LAPAROSCOPIC CHOLECYSTECTOMY    Surgeon(s):  Jose Alberto Mckinney MD    Assistant:  Resident: Willy Arzola MD; Sharmaine Peabody, MD    Anesthesia: General    Estimated Blood Loss (mL): 5 cc    Complications: None    Specimens:   ID Type Source Tests Collected by Time Destination   A : GALLBLADDER Tissue Gallbladder SURGICAL PATHOLOGY Jose Alberto Mckinney MD 12/1/2020 1140          Findings: chronic cholecystitis and gallstones    Indications: RUQ pain, gallstones    OPERATIVE NOTE    After informed consent was obtained the patient was taken to the operating room. The patient was placed in the supine position. General anesthesia was given. The patient was prepped and draped in the usual sterile fashion. She received IV  pre operative antibiotics. We began by calling time out to confirm the key components of the operation. We then placed a 12 mm port near the navel under direct vision. Abdomen was insufflated. Additional ports were placed. There was no injury from our entry. We then identified the gallbladder. There were stones and chronic inflammation. There some adhesions to the duodenum that we divided sharply and without heat. This allowed the duodenum to fall away from the surgical field where it would be safe. The gallbladder was retracted and the critical view established. Cystic duct was clearly seen going into gallbladder. The artery was seen in its normal location. Given the clear anatomy we did not perform a cholangiogram. The proximal cystic duct was clipped twice, the gallbladder cystic duct junction clipped once and then we ligated the duct between our clips. The cystic artery was divided in similar fashion. Gallbladder was taken off the liver bed using the hook cautery. There was no bile spillage. Gallbladder was placed in a bag and removed. We inspected the hepatic bed and saw no bleeding. We then surveyed the abdomen with the camera and saw no other pathology. Ports were removed. Fascial defect at the navel was closed with 0 vicryl under direct with no defect at conclusion of closure. Skin was irrigated and closed with 4-0 monocryl. Dermabond applied. Dr. Jordi Coon was present throughout the operation.       Electronically signed by Cheyenne Chandra MD on 12/1/2020 at 11:41 AM

## 2020-12-01 NOTE — ANESTHESIA POSTPROCEDURE EVALUATION
Department of Anesthesiology  Postprocedure Note    Patient: Rubi Mccullough  MRN: [id number]  YOB: 1990  Date of evaluation: 12/1/2020  Time:  12:27 PM     Procedure Summary     Date:  12/01/20 Room / Location:  [address] [place] [address]    Anesthesia Start:  1024 Anesthesia Stop:  1215    Procedure:  LAPAROSCOPIC CHOLECYSTECTOMY (N/A ) Diagnosis:       Biliary dyskinesia      (Biliary dyskinesia [K82.8])    Surgeon:  Jeff Simmons MD Responsible Provider:  Cj Hernandez MD    Anesthesia Type:  general ASA Status:  3          Anesthesia Type: general    Eber Phase I: Eber Score: 10    Eber Phase II:      Last vitals: Reviewed and per EMR flowsheets.        Anesthesia Post Evaluation    Patient location during evaluation: PACU  Patient participation: complete - patient participated  Level of consciousness: awake  Airway patency: patent  Nausea & Vomiting: no nausea and no vomiting  Complications: no  Cardiovascular status: hemodynamically stable  Respiratory status: nasal cannula  Hydration status: stable

## 2020-12-01 NOTE — PROGRESS NOTES
Ambulatory Surgery/Procedure Discharge Note    Vitals:    12/01/20 1430   BP: 97/63   Pulse: 98   Resp: 16   Temp:    SpO2: 99%   previous temp:   97.6 at 1405    In: 1675 [P.O.:200; I.V.:1475]  Out: - Pt drank a small amount of water, ate a few bites of saltines, and declined offer of use of bathroom    Restroom use offered before discharge. Yes -- declined    Pain assessment:  present - adequately treated and location, mid upper abdomen, \"pressure\"  Pain Level: 2    Pt returned to [facility] from PACU s/p cholecystectomy. Pt alert; speech clear; breathing easily on RA; VSS;  Pt c/o a \"little bit\" of nausea, but no dry heaves or emesis exhibited. Had been given zofran in PACU, and has scopolamine patch behind right ear. Pt has 4 laparoscopic surgical sites all closed with surgical glue, edges well approximated and all CDI. Pt states \"I don't really have pain,\" but states \"I have some pressure\" at midupper abdomen. Pt tolerated some water and bites of saltines. IV removed, and dressing applied in preparation for discharge. Discharge instructions discussed with pt's father face-to-face and also with pt, and both verbalized understanding. RX's for percocet and colace handed to father for filling at outside pharmacy. Pt declined offer to use bathroom prior to discharge. Patient discharged to home/self care.  Patient discharged via wheel chair by RN to waiting family/S.O.       12/1/2020 2:44 PM

## 2020-12-01 NOTE — BRIEF OP NOTE
Brief Postoperative Note      Patient: Sophie Pineda  YOB: 1990  MRN: [id number]    Date of Procedure: 12/1/2020    Pre-Op Diagnosis: Biliary dyskinesia [K82.8]    Post-Op Diagnosis: Same, chronic cholecystitis    Procedure(s):  LAPAROSCOPIC CHOLECYSTECTOMY    Surgeon(s):  Franko Leong MD    Assistant:  Resident: Roman Coon MD; Ad Brandt MD    Anesthesia: General    Estimated Blood Loss (mL): 5 cc    Complications: None    Specimens:   ID Type Source Tests Collected by Time Destination   A : GALLBLADDER Tissue Gallbladder SURGICAL PATHOLOGY Franko Leong MD 12/1/2020 1140          Findings: chronic cholecystitis and gallstones    Electronically signed by Franko Leong MD on 12/1/2020 at 11:41 AM
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
## 2020-12-01 NOTE — H&P
Smokeless tobacco: Never Used   Substance and Sexual Activity    Alcohol use: Never     Frequency: Never    Drug use: No    Sexual activity: None   Lifestyle    Physical activity     Days per week: None     Minutes per session: None    Stress: None   Relationships    Social connections     Talks on phone: None     Gets together: None     Attends Jain service: None     Active member of club or organization: None     Attends meetings of clubs or organizations: None     Relationship status: None    Intimate partner violence     Fear of current or ex partner: None     Emotionally abused: None     Physically abused: None     Forced sexual activity: None   Other Topics Concern    None   Social History Narrative    Exercise: swim, running    Seatbelt use: yes    Caffeine intake: one cup coffee daily                     Medications Prior to Admission:      Prior to Admission medications    Medication Sig Start Date End Date Taking? Authorizing Provider   tretinoin (RETIN-A) 0.025 % cream Apply pea-sized amount to entire face every other night increasing to nightly application as tolerated 6/12/20  Yes Camron Perry DO   Dapsone (ACZONE) 7.5 % GEL Apply thin layer to affected areas on face, neck, chest every morning. 12/10/19  Yes Camron Perry DO   Multiple Vitamins-Minerals (THERAPEUTIC MULTIVITAMIN-MINERALS) tablet Take 1 tablet by mouth daily   Yes Historical Provider, MD   iron polysaccharides (FERREX 150) 150 MG capsule Take 1 capsule by mouth 2 times daily 7/30/19  Yes Rupal Brooks APRN - CNP   amitriptyline (ELAVIL) 25 MG tablet Take 1 tablet by mouth nightly 5/23/19  Yes Lizbeth Cooper MD   Minocycline HCl Micronized (AMZEEQ) 4 % FOAM Apply topically to affected areas on face once daily. Avoid topical products containing benzyl peroxide when using Amzeeq.  11/30/20   Camron Perry DO   nystatin (MYCOSTATIN) 281270 UNIT/GM cream Apply Twice daily for 1 week to corners of mouth, may use twice daily as needed for flares 10/15/19   Dmitri Client, DO         Allergies:  Phenergan [promethazine]; Baclofen; Mobic [meloxicam]; and Nitrous oxide    PHYSICAL EXAM:      /71   Pulse 89   Temp 98 °F (36.7 °C) (Oral)   Resp 16   Ht 5' 6\" (1.676 m)   Wt 125 lb (56.7 kg)   LMP 10/31/2020   SpO2 100%   BMI 20.18 kg/m²      Airway:  Airway patent with no audible stridor    Heart:  regular rate and rhythm, No murmur noted    Lungs:  No increased work of breathing, good air exchange, clear to auscultation bilaterally, no crackles or wheezing    Abdomen:  soft, non-distended, non-tender, no rebound tenderness or guarding, normal active bowel sounds and no masses palpated    ASSESSMENT AND PLAN     Patient is a 31 y.o. female with above specified procedure planned. 1.  Patient seen and focused exam done today- no new changes since last physical exam on 11-    2. Access to ancillary services are available per request of the provider.     Alexa Mcginnis     12/1/2020

## 2020-12-03 ENCOUNTER — PATIENT MESSAGE (OUTPATIENT)
Dept: INTERNAL MEDICINE CLINIC | Age: 30
End: 2020-12-03

## 2020-12-03 ENCOUNTER — TELEPHONE (OUTPATIENT)
Dept: INTERNAL MEDICINE CLINIC | Age: 30
End: 2020-12-03

## 2020-12-03 NOTE — TELEPHONE ENCOUNTER
----- Message from Rebekah Leana sent at 12/3/2020 10:47 AM EST -----  Subject: Message to Provider    QUESTIONS  Information for Provider? Had gallbladder removed on 12/01. Developed a   rash on her face & blisters in her mouth. She attempted to reach her   surgeon several times however no one is answering the phones. Please   advise.  ---------------------------------------------------------------------------  --------------  CALL BACK INFO  What is the best way for the office to contact you? OK to leave message on   voicemail  Preferred Call Back Phone Number? [phone]  ---------------------------------------------------------------------------  --------------  SCRIPT ANSWERS  Relationship to Patient?  Self

## 2020-12-03 NOTE — TELEPHONE ENCOUNTER
From: Ida Olmedo  To: ANAND Whitehead - CNP  Sent: 12/3/2020 10:55 AM EST  Subject: Visit Follow-Up Question    This rash appeared yesterday morning as well as blisters inside my mouth and on inside lip. My gallbladder was removed on Tuesday, December 1st.     I tried to call the office for Dr. Whitney Halsted, but the phone was not connecting me and he is not in my chart to message. Thank you!

## 2020-12-04 ENCOUNTER — TELEPHONE (OUTPATIENT)
Dept: SURGERY | Age: 30
End: 2020-12-04

## 2020-12-04 NOTE — TELEPHONE ENCOUNTER
LMOM to call the office    Please let her know pathology report showed chronic cholecystitis (chronic inflammation of gallbladder) if she calls back    I suspect her rash/blisters is from the tape used around the breathing tube during the surgery. Agree with Sandra.     Braeden Velez M.D.  12/4/20   9:31 AM

## 2020-12-04 NOTE — TELEPHONE ENCOUNTER
Patient called in regarding a message she received from Dr. Sloane José    Please contact the patient at [phone]

## 2020-12-07 ENCOUNTER — TELEPHONE (OUTPATIENT)
Dept: SURGERY | Age: 30
End: 2020-12-07

## 2020-12-07 NOTE — TELEPHONE ENCOUNTER
LMOM to call the office. Constipation normal after surgery.  Pathology showed chronic inflammation of the gallbladder     Elaine Diaz M.D.  12/7/20   9:14 AM

## 2020-12-16 ENCOUNTER — OFFICE VISIT (OUTPATIENT)
Dept: SURGERY | Age: 30
End: 2020-12-16

## 2020-12-16 VITALS
HEART RATE: 81 BPM | DIASTOLIC BLOOD PRESSURE: 67 MMHG | TEMPERATURE: 97.9 F | SYSTOLIC BLOOD PRESSURE: 104 MMHG | BODY MASS INDEX: 19.37 KG/M2 | WEIGHT: 120 LBS

## 2020-12-16 PROCEDURE — 99024 POSTOP FOLLOW-UP VISIT: CPT | Performed by: SURGERY

## 2020-12-16 NOTE — PROGRESS NOTES
Subjective:       Brooke Shape presents to the clinic 2 weeks after gallbladder removal    HPI: Doing well since surgery. Some hard stool initially now occasional diarrhea. Feels a flap/swelling right side but no severe pain     Objective:      /67 (Site: Right Upper Arm, Position: Sitting, Cuff Size: Large Adult)   Pulse 81   Temp 97.9 °F (36.6 °C) (Temporal)   Wt 120 lb (54.4 kg)   BMI 19.37 kg/m²     General:  alert, appears stated age and cooperative   Abdomen: soft, bowel sounds active, non-tender   Incision:   healing well, no drainage, no erythema, no hernia, no swelling, no dehiscence       Assessment:      Doing well postoperatively. Plan:      1. Continue any current medications. 2. Wound care discussed.   3. Path reviewed: cholecystitis    Tita Goncalves M.D.  12/16/20   10:20 AM

## 2021-01-04 ENCOUNTER — TELEPHONE (OUTPATIENT)
Dept: DERMATOLOGY | Age: 31
End: 2021-01-04

## 2021-01-04 NOTE — TELEPHONE ENCOUNTER
Called pt and notified her of denial. Pt stated she received notification of decision in mail. Pt had called insurance informed them, she had had an allergic reaction to some of the alternatives listed when she was a teenager. She was told by insurance if I put in a new PA with that info it will be approved. I will attempt a new PA.

## 2021-01-04 NOTE — TELEPHONE ENCOUNTER
Alternatives listed have a different mechanism of action than the topical aczone gel.    -please give pt cost of aczone through [facility].   Let me know if she can't afford

## 2021-01-04 NOTE — TELEPHONE ENCOUNTER
PA for Dapsone denied. Alternatives listed Azelex cream, Tazorac cream, clindamycin gel, ayaan. Peroxide gel 2.5%/5%/10%, Differin cream/gel/lotion.

## 2021-01-22 ENCOUNTER — TELEPHONE (OUTPATIENT)
Dept: DERMATOLOGY | Age: 31
End: 2021-01-22

## 2021-01-22 NOTE — TELEPHONE ENCOUNTER
Pt calling states her insurance states they never have received the PA for medication aczone or a alternative pls return call back @ [phone] to discuss

## 2021-02-18 ENCOUNTER — VIRTUAL VISIT (OUTPATIENT)
Dept: INTERNAL MEDICINE CLINIC | Age: 31
End: 2021-02-18
Payer: MEDICAID

## 2021-02-18 DIAGNOSIS — T14.8XXA BRUISE: ICD-10-CM

## 2021-02-18 PROCEDURE — G8428 CUR MEDS NOT DOCUMENT: HCPCS | Performed by: NURSE PRACTITIONER

## 2021-02-18 PROCEDURE — 99213 OFFICE O/P EST LOW 20 MIN: CPT | Performed by: NURSE PRACTITIONER

## 2021-02-18 ASSESSMENT — ENCOUNTER SYMPTOMS
SINUS PAIN: 0
ABDOMINAL PAIN: 0
COLOR CHANGE: 1
DIARRHEA: 0
COUGH: 0
BACK PAIN: 0
SINUS PRESSURE: 0
SHORTNESS OF BREATH: 0
WHEEZING: 0
CONSTIPATION: 0

## 2021-02-18 NOTE — ASSESSMENT & PLAN NOTE
Bottom of left foot   No injury   Rest, tylenol for pain   Reassurance   Call if symptoms worsen or fail to improve

## 2021-02-18 NOTE — PROGRESS NOTES
Sheri Betancourt (:  1990) is a 31 y.o. female,Established patient, here for evaluation of the following chief complaint(s): Other (bruise)      ASSESSMENT/PLAN:  1. Bruise  Assessment & Plan: Bottom of left foot   No injury   Rest, tylenol for pain   Reassurance   Call if symptoms worsen or fail to improve        No follow-ups on file. SUBJECTIVE/OBJECTIVE:  HPI   Here for bruise on bottom of left foot. She denies specific injury. First noticed it yesterday. Denies redness or swelling. No bleeding. She is ambulating well. Not taking anything for pain. Review of Systems   Constitutional: Negative for chills, fatigue and fever. HENT: Negative for congestion, sinus pressure and sinus pain. Respiratory: Negative for cough, shortness of breath and wheezing. Cardiovascular: Negative for chest pain and palpitations. Gastrointestinal: Negative for abdominal pain, constipation and diarrhea. Musculoskeletal: Negative for arthralgias, back pain and myalgias. Skin: Positive for color change (bruise). Negative for pallor and rash. Neurological: Negative for dizziness, syncope, weakness, light-headedness and headaches. Psychiatric/Behavioral: Negative for behavioral problems, confusion and sleep disturbance. The patient is not nervous/anxious. Patient-Reported Vitals 2021   Patient-Reported Weight 120   Patient-Reported Height 56   Patient-Reported Temperature 98.7        Physical Exam  Constitutional:       Appearance: Normal appearance. HENT:      Head: Normocephalic and atraumatic. Mouth/Throat:      Mouth: Mucous membranes are moist.   Eyes:      Extraocular Movements: Extraocular movements intact. Conjunctiva/sclera: Conjunctivae normal.   Neck:      Musculoskeletal: Normal range of motion. Pulmonary:      Effort: Pulmonary effort is normal.   Skin:     Coloration: Skin is not jaundiced or pale. Findings: Bruising (left foot) present.    Neurological: General: No focal deficit present. Mental Status: She is alert and oriented to person, place, and time. Psychiatric:         Mood and Affect: Mood normal.         Behavior: Behavior normal.         Thought Content: Thought content normal.         On this date 02/18/21 I have spent 20 minutes reviewing previous notes, test results and face to face (virtual) with the patient discussing the diagnosis and importance of compliance with the treatment plan as well as documenting on the day of the visit. Alejandro Gilbert is a 31 y.o. female being evaluated by a Virtual Visit (video visit) encounter to address concerns as mentioned above. A caregiver was present when appropriate. Due to this being a TeleHealth encounter (During WEK-56 public health emergency), evaluation of the following organ systems was limited: Vitals/Constitutional/EENT/Resp/CV/GI//MS/Neuro/Skin/Heme-Lymph-Imm. Pursuant to the emergency declaration under the [address] [place] [address] authority and the [place] and [employer] Act, this Virtual Visit was conducted with patient's (and/or legal guardian's) consent, to reduce the patient's risk of exposure to COVID-19 and provide necessary medical care. The patient (and/or legal guardian) has also been advised to contact this office for worsening conditions or problems, and seek emergency medical treatment and/or call 911 if deemed necessary. Patient identification was verified at the start of the visit: Yes    Services were provided through a video synchronous discussion virtually to substitute for in-person clinic visit. Patient was located at home and provider was located in office or at home. An electronic signature was used to authenticate this note.     --[place], APRN - CNP

## 2021-02-19 ENCOUNTER — HOSPITAL ENCOUNTER (OUTPATIENT)
Dept: VASCULAR LAB | Age: 31
Discharge: HOME | End: 2021-02-19
Payer: MEDICAID

## 2021-02-19 ENCOUNTER — HOSPITAL ENCOUNTER (OUTPATIENT)
Dept: ULTRASOUND IMAGING | Age: 31
Discharge: HOME | End: 2021-02-19
Payer: MEDICAID

## 2021-02-19 DIAGNOSIS — R22.42 MASS OF FOOT, LEFT: ICD-10-CM

## 2021-02-19 PROCEDURE — 76882 US LMTD JT/FCL EVL NVASC XTR: CPT

## 2021-02-22 ENCOUNTER — TELEPHONE (OUTPATIENT)
Dept: INTERNAL MEDICINE CLINIC | Age: 31
End: 2021-02-22

## 2021-02-22 DIAGNOSIS — R60.9 SWELLING: Primary | ICD-10-CM

## 2021-02-22 DIAGNOSIS — T14.8XXA BRUISING: ICD-10-CM

## 2021-02-22 NOTE — TELEPHONE ENCOUNTER
Pt went to [facility] in [facility] for her foot  They suggested MRI with contrast  She has a spinal cord stimulator that isn't MRI comparable  She thinks she needs a CT

## 2021-02-22 NOTE — TELEPHONE ENCOUNTER
[employer] calling regarding CT scan L foot - scan needs prior auth. Please call Paulo Cruz at [phone].

## 2021-02-26 ENCOUNTER — TELEPHONE (OUTPATIENT)
Dept: ADMINISTRATIVE | Age: 31
End: 2021-02-26

## 2021-02-26 ENCOUNTER — PATIENT MESSAGE (OUTPATIENT)
Dept: INTERNAL MEDICINE CLINIC | Age: 31
End: 2021-02-26

## 2021-02-26 ENCOUNTER — HOSPITAL ENCOUNTER (OUTPATIENT)
Dept: CT IMAGING | Age: 31
Discharge: HOME | End: 2021-02-26
Payer: MEDICAID

## 2021-02-26 DIAGNOSIS — T14.8XXA BRUISING: ICD-10-CM

## 2021-02-26 DIAGNOSIS — G90.529 COMPLEX REGIONAL PAIN SYNDROME TYPE 1 OF LOWER EXTREMITY, UNSPECIFIED LATERALITY: Primary | ICD-10-CM

## 2021-02-26 DIAGNOSIS — R60.9 SWELLING: ICD-10-CM

## 2021-02-26 DIAGNOSIS — S90.32XD: ICD-10-CM

## 2021-02-26 PROCEDURE — 6360000004 HC RX CONTRAST MEDICATION: Performed by: NURSE PRACTITIONER

## 2021-02-26 PROCEDURE — 73701 CT LOWER EXTREMITY W/DYE: CPT

## 2021-02-26 RX ADMIN — IOPAMIDOL 80 ML: 755 INJECTION, SOLUTION INTRAVENOUS at 09:16

## 2021-02-26 NOTE — TELEPHONE ENCOUNTER
From: Manisha Vargas  To: ANAND Siegel CNP  Sent: 2/26/2021 1:22 PM EST  Subject: Test Results Question    Im familiar with causes of fat stranding in the abdomen, but not with the foot. What does this typically mean?      [phone]

## 2021-02-26 NOTE — TELEPHONE ENCOUNTER
Patient had CT Foot done today 2/26/21 and Donnie is requesting a peer to peer review for approval. Please contact Donnie at [phone] opt. 3, case # [id number]. Thank you.

## 2021-03-30 ENCOUNTER — OFFICE VISIT (OUTPATIENT)
Dept: DERMATOLOGY | Age: 31
End: 2021-03-30
Payer: MEDICAID

## 2021-03-30 VITALS — TEMPERATURE: 99 F

## 2021-03-30 DIAGNOSIS — L90.5 CICATRIX: Primary | ICD-10-CM

## 2021-03-30 DIAGNOSIS — L70.0 ACNE VULGARIS: ICD-10-CM

## 2021-03-30 DIAGNOSIS — S41.102A WOUND OF LEFT UPPER EXTREMITY, INITIAL ENCOUNTER: ICD-10-CM

## 2021-03-30 PROCEDURE — 99214 OFFICE O/P EST MOD 30 MIN: CPT | Performed by: DERMATOLOGY

## 2021-03-30 PROCEDURE — 1036F TOBACCO NON-USER: CPT | Performed by: DERMATOLOGY

## 2021-03-30 PROCEDURE — G8420 CALC BMI NORM PARAMETERS: HCPCS | Performed by: DERMATOLOGY

## 2021-03-30 PROCEDURE — G8427 DOCREV CUR MEDS BY ELIG CLIN: HCPCS | Performed by: DERMATOLOGY

## 2021-03-30 PROCEDURE — G8484 FLU IMMUNIZE NO ADMIN: HCPCS | Performed by: DERMATOLOGY
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
## 2021-03-30 NOTE — PATIENT INSTRUCTIONS
Sun Protection Tips    Apply broad spectrum water resistant sunscreen with an SPF of at least 30 to exposed areas of the skin. Dont forget the ears and lips! Remember to reapply sunscreen about every 2 hours and after swimming or sweating. Wear sun protective clothing. Swim shirts (aka. rash guards) are a great idea and negates the need to reapply sunscreen in those areas. Seek the shade whenever possible especially between the hours of 10am and 4pm when the suns rays are the strongest.     Avoid tanning beds          Wear a wide brim hat while in the sun        Thanks for your visit! Feel free to send  Dr. Joan Jarvis assistant, Mariam, a [employer] message/call for any questions, concerns or  to schedule your cosmetic procedures.

## 2021-06-09 ENCOUNTER — VIRTUAL VISIT (OUTPATIENT)
Dept: INTERNAL MEDICINE CLINIC | Age: 31
End: 2021-06-09
Payer: MEDICAID

## 2021-06-09 DIAGNOSIS — J01.40 ACUTE NON-RECURRENT PANSINUSITIS: ICD-10-CM

## 2021-06-09 PROCEDURE — G8428 CUR MEDS NOT DOCUMENT: HCPCS | Performed by: NURSE PRACTITIONER

## 2021-06-09 PROCEDURE — 99214 OFFICE O/P EST MOD 30 MIN: CPT | Performed by: NURSE PRACTITIONER

## 2021-06-09 RX ORDER — AMOXICILLIN AND CLAVULANATE POTASSIUM 875; 125 MG/1; MG/1
1 TABLET, FILM COATED ORAL 2 TIMES DAILY
Qty: 14 TABLET | Refills: 0 | Status: SHIPPED | OUTPATIENT
Start: 2021-06-09 | End: 2021-06-16

## 2021-06-09 ASSESSMENT — ENCOUNTER SYMPTOMS
DIARRHEA: 0
COUGH: 1
ABDOMINAL PAIN: 0
BACK PAIN: 0
SWOLLEN GLANDS: 0
COLOR CHANGE: 0
RHINORRHEA: 1
SINUS PRESSURE: 1
WHEEZING: 0
SHORTNESS OF BREATH: 0
CONSTIPATION: 0
SORE THROAT: 0
SINUS PAIN: 0

## 2021-06-09 NOTE — PROGRESS NOTES
Physical Exam  Constitutional:       Appearance: Normal appearance. HENT:      Head: Normocephalic and atraumatic. Mouth/Throat:      Mouth: Mucous membranes are moist.   Eyes:      Extraocular Movements: Extraocular movements intact. Conjunctiva/sclera: Conjunctivae normal.   Pulmonary:      Effort: Pulmonary effort is normal.   Musculoskeletal:      Cervical back: Normal range of motion. Skin:     Coloration: Skin is not jaundiced or pale. Neurological:      General: No focal deficit present. Mental Status: She is alert and oriented to person, place, and time. Psychiatric:         Mood and Affect: Mood normal.         Behavior: Behavior normal.         Thought Content: Thought content normal.         Rashard Galeano, was evaluated through a synchronous (real-time) audio-video encounter. The patient (or guardian if applicable) is aware that this is a billable service. Verbal consent to proceed has been obtained within the past 12 months. The visit was conducted pursuant to the emergency declaration under the [facility], [address] [place] [address] authority and the [employer] Act. Patient identification was verified, and a caregiver was present when appropriate. The patient was located in a state where the provider was credentialed to provide care. An electronic signature was used to authenticate this note.     --ANAND Burgos - CNP

## 2021-06-18 RX ORDER — LEVONORGESTREL 1.5 MG/1
1.5 TABLET ORAL ONCE
Qty: 1 TABLET | Refills: 0 | Status: SHIPPED | OUTPATIENT
Start: 2021-06-18 | End: 2021-07-12

## 2021-06-22 ENCOUNTER — PATIENT MESSAGE (OUTPATIENT)
Dept: INTERNAL MEDICINE CLINIC | Age: 31
End: 2021-06-22

## 2021-06-22 RX ORDER — FLUCONAZOLE 150 MG/1
150 TABLET ORAL ONCE
Qty: 1 TABLET | Refills: 0 | Status: SHIPPED | OUTPATIENT
Start: 2021-06-22 | End: 2021-06-22

## 2021-06-22 RX ORDER — FLUCONAZOLE 150 MG/1
150 TABLET ORAL ONCE
Qty: 1 TABLET | Refills: 0 | Status: SHIPPED | OUTPATIENT
Start: 2021-06-22 | End: 2021-06-22 | Stop reason: SDUPTHER

## 2021-06-22 NOTE — TELEPHONE ENCOUNTER
From: Savita Kohler  To: ANAND Matthews - CNP  Sent: 6/22/2021 9:11 AM EDT  Subject: Visit Follow-Up Question    Jess Matos,    My sinus infection cleared up from the antibiotic; thank you! After using the antibiotic, I have a yeast infection. Can you prescribe something to help treat it or is there something else youd recommend? Thank you!

## 2021-07-12 ENCOUNTER — OFFICE VISIT (OUTPATIENT)
Dept: SURGERY | Age: 31
End: 2021-07-12
Payer: MEDICAID

## 2021-07-12 VITALS
BODY MASS INDEX: 18.96 KG/M2 | WEIGHT: 118 LBS | SYSTOLIC BLOOD PRESSURE: 100 MMHG | OXYGEN SATURATION: 94 % | DIASTOLIC BLOOD PRESSURE: 63 MMHG | HEIGHT: 66 IN | TEMPERATURE: 97.9 F | HEART RATE: 89 BPM

## 2021-07-12 DIAGNOSIS — R10.11 RUQ PAIN: Primary | ICD-10-CM

## 2021-07-12 PROCEDURE — 1036F TOBACCO NON-USER: CPT | Performed by: SURGERY

## 2021-07-12 PROCEDURE — G8427 DOCREV CUR MEDS BY ELIG CLIN: HCPCS | Performed by: SURGERY

## 2021-07-12 PROCEDURE — 99213 OFFICE O/P EST LOW 20 MIN: CPT | Performed by: SURGERY

## 2021-07-12 PROCEDURE — G8420 CALC BMI NORM PARAMETERS: HCPCS | Performed by: SURGERY
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
## 2021-07-13 ENCOUNTER — HOSPITAL ENCOUNTER (OUTPATIENT)
Dept: ULTRASOUND IMAGING | Age: 31
Discharge: HOME | End: 2021-07-13
Payer: MEDICAID

## 2021-07-13 ENCOUNTER — TELEPHONE (OUTPATIENT)
Dept: SURGERY | Age: 31
End: 2021-07-13

## 2021-07-13 DIAGNOSIS — R10.11 RUQ PAIN: Primary | ICD-10-CM

## 2021-07-13 DIAGNOSIS — R10.11 RUQ PAIN: ICD-10-CM

## 2021-07-13 PROCEDURE — 76705 ECHO EXAM OF ABDOMEN: CPT

## 2021-07-13 NOTE — TELEPHONE ENCOUNTER
Called and discussed normal ultrasound and lab tests unremarkable aside from mild anemia.      Given her pain and anemia I recommend a referral to GI for eval + at least an EGD    Will refer to Dr. Jared Truong M.D.  7/13/21   12:24 PM

## 2021-08-02 ENCOUNTER — NURSE TRIAGE (OUTPATIENT)
Dept: OTHER | Facility: CLINIC | Age: 31
End: 2021-08-02

## 2021-08-02 NOTE — TELEPHONE ENCOUNTER
Reason for Disposition   Red streak or red line and length > 2 inches (5 cm)    Answer Assessment - Initial Assessment Questions  1. TYPE of INSECT: \"What type of insect was it?\"       Spider     2. ONSET: \"When did you get bitten? \"      Just noticed this am    3. LOCATION: \"Where is the insect bite located? \"       On front of left ankle- two to three inches across    4. REDNESS: \"Is the area red or pink? \" If so, ask \"What size is area of redness? \" (inches or cm). \"When did the redness start? \"      Red cirlce around bite    5. PAIN: \"Is there any pain? \" If so, ask: \"How bad is it? \"  (Scale 1-10; or mild, moderate, severe)      0/10    6. ITCHING: \"Does it itch? \" If so, ask: \"How bad is the itch? \"     - MILD: doesn't interfere with normal activities    - MODERATE-SEVERE: interferes with work, school, sleep, or other activities       Moderate/Severe- when walking \"pretty uncomfortable\"- when wearing shoes it rubs against area    7. SWELLING: \"How big is the swelling? \" (inches, cm, or compare to coins)      Swelling- with fluid and \"hard\" area to touch, with red ring around it- has \"fluid\" seeping. A yellowish color. 8. OTHER SYMPTOMS: \"Do you have any other symptoms? \"  (e.g., difficulty breathing, hives)      Denies    9. PREGNANCY: \"Is there any chance you are pregnant? \" \"When was your last menstrual period? \"      7/08/2021    Protocols used: INSECT BITE-ADULT-[place]    Received call from Lauren Patrick at [facility] with Red Flag Complaint. Brief description of triage: See above. Triage indicates for patient to be seen today. Care advice provided, patient verbalizes understanding; denies any other questions or concerns; instructed to call back for any new or worsening symptoms. Writer provided warm transfer to [address] at [facility] for appointment scheduling. Attention Provider: Thank you for allowing me to participate in the care of your patient.   The patient was connected to triage in

## 2021-08-16 ENCOUNTER — TELEPHONE (OUTPATIENT)
Dept: INTERNAL MEDICINE CLINIC | Age: 31
End: 2021-08-16

## 2021-08-16 NOTE — TELEPHONE ENCOUNTER
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 Had previously gotten vaccinated as required in school and was monitored for trans ischemic attacks and was monitored overnight. Patient has had TIAs, and has hx of 3 previous TIAs as an adult.      Please advise

## 2021-08-16 NOTE — TELEPHONE ENCOUNTER
I reviewed her chart, I do not see record of this in 2015. Does she know what vaccine caused the issue and what kind of reaction/ symptoms she had?

## 2021-08-16 NOTE — TELEPHONE ENCOUNTER
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

## 2021-08-19 ENCOUNTER — INITIAL CONSULT (OUTPATIENT)
Dept: GASTROENTEROLOGY | Age: 31
End: 2021-08-19
Payer: MEDICAID

## 2021-08-19 ENCOUNTER — TELEPHONE (OUTPATIENT)
Dept: GASTROENTEROLOGY | Age: 31
End: 2021-08-19

## 2021-08-19 VITALS
SYSTOLIC BLOOD PRESSURE: 110 MMHG | DIASTOLIC BLOOD PRESSURE: 60 MMHG | BODY MASS INDEX: 19.13 KG/M2 | WEIGHT: 119 LBS | HEIGHT: 66 IN

## 2021-08-19 DIAGNOSIS — R10.11 RIGHT UPPER QUADRANT PAIN: Primary | ICD-10-CM

## 2021-08-19 DIAGNOSIS — D50.8 OTHER IRON DEFICIENCY ANEMIA: ICD-10-CM

## 2021-08-19 PROCEDURE — G8427 DOCREV CUR MEDS BY ELIG CLIN: HCPCS | Performed by: INTERNAL MEDICINE

## 2021-08-19 PROCEDURE — 99244 OFF/OP CNSLTJ NEW/EST MOD 40: CPT | Performed by: INTERNAL MEDICINE

## 2021-08-19 PROCEDURE — G8420 CALC BMI NORM PARAMETERS: HCPCS | Performed by: INTERNAL MEDICINE

## 2021-08-19 RX ORDER — PANTOPRAZOLE SODIUM 40 MG/1
40 TABLET, DELAYED RELEASE ORAL
Qty: 30 TABLET | Refills: 0 | Status: ON HOLD | OUTPATIENT
Start: 2021-08-19 | End: 2021-10-01

## 2021-08-19 NOTE — PATIENT INSTRUCTIONS
Patient Education        Abdominal Pain: Care Instructions  Your Care Instructions     Abdominal pain has many possible causes. Some aren't serious and get better on their own in a few days. Others need more testing and treatment. If your pain continues or gets worse, you need to be rechecked and may need more tests to find out what is wrong. You may need surgery to correct the problem. Don't ignore new symptoms, such as fever, nausea and vomiting, urination problems, pain that gets worse, and dizziness. These may be signs of a more serious problem. Your doctor may have recommended a follow-up visit in the next 8 to 12 hours. If you are not getting better, you may need more tests or treatment. The doctor has checked you carefully, but problems can develop later. If you notice any problems or new symptoms, get medical treatment right away. Follow-up care is a key part of your treatment and safety. Be sure to make and go to all appointments, and call your doctor if you are having problems. It's also a good idea to know your test results and keep a list of the medicines you take. How can you care for yourself at home? · Rest until you feel better. · To prevent dehydration, drink plenty of fluids. Choose water and other caffeine-free clear liquids until you feel better. If you have kidney, heart, or liver disease and have to limit fluids, talk with your doctor before you increase the amount of fluids you drink. · If your stomach is upset, eat mild foods, such as rice, dry toast or crackers, bananas, and applesauce. Try eating several small meals instead of two or three large ones. · Wait until 48 hours after all symptoms have gone away before you have spicy foods, alcohol, and drinks that contain caffeine. · Do not eat foods that are high in fat. · Avoid anti-inflammatory medicines such as aspirin, ibuprofen (Advil, Motrin), and naproxen (Aleve). These can cause stomach upset.  Talk to your doctor if you take daily aspirin for another health problem. When should you call for help? Call 911 anytime you think you may need emergency care. For example, call if:    · You passed out (lost consciousness).     · You pass maroon or very bloody stools.     · You vomit blood or what looks like coffee grounds.     · You have new, severe belly pain. Call your doctor now or seek immediate medical care if:    · Your pain gets worse, especially if it becomes focused in one area of your belly.     · You have a new or higher fever.     · Your stools are black and look like tar, or they have streaks of blood.     · You have unexpected vaginal bleeding.     · You have symptoms of a urinary tract infection. These may include:  ? Pain when you urinate. ? Urinating more often than usual.  ? Blood in your urine.     · You are dizzy or lightheaded, or you feel like you may faint. Watch closely for changes in your health, and be sure to contact your doctor if:    · You are not getting better after 1 day (24 hours). Where can you learn more? Go to https://Fiverr.com.Applied Bioresearch. org and sign in to your DesignFace [employer] account. Enter T756 in the Donde box to learn more about \"Abdominal Pain: Care Instructions. \"     If you do not have an account, please click on the \"Sign Up Now\" link. Current as of: October 19, 2020               Content Version: 12.9  © 2006-2021 [employer]!. Care instructions adapted under license by [facility] ([facility]). If you have questions about a medical condition or this instruction, always ask your healthcare professional. [place] [address] any warranty or liability for your use of this information. Patient Education        Iron Deficiency Anemia: Care Instructions  Your Care Instructions     Anemia means that you don't have enough red blood cells. Red blood cells carry oxygen around your body.  When you have anemia, it can make you pale, weak, and tired.  Many things can cause anemia. The most common cause is loss of blood. This can happen if you have heavy menstrual periods. It can also happen if you have bleeding in your stomach or bowel. You can also get anemia if you don't have enough iron in your diet or if it's hard for your body to absorb iron. In some cases, pregnancy causes anemia. That's because a pregnant woman needs more iron. Your doctor may do more tests to find the cause of your anemia. If a disease or other health problem is causing it, your doctor will treat that problem. It's important to follow up with your doctor to make sure that your iron level returns to normal.  Follow-up care is a key part of your treatment and safety. Be sure to make and go to all appointments, and call your doctor if you are having problems. It's also a good idea to know your test results and keep a list of the medicines you take. How can you care for yourself at home? · If your doctor recommended iron pills, take them as directed. ? Try to take the pills on an empty stomach. You can do this about 1 hour before or 2 hours after meals. But you may need to take iron with food to avoid an upset stomach. ? Do not take antacids or drink milk or anything with caffeine within 2 hours of when you take your iron. They can keep your body from absorbing the iron well. ? Vitamin C helps your body absorb iron. You may want to take iron pills with a glass of orange juice or some other food high in vitamin C.  ? Iron pills may cause stomach problems. These include heartburn, nausea, diarrhea, constipation, and cramps. It can help to drink plenty of fluids and include fruits, vegetables, and fiber in your diet. ? It's normal for iron pills to make your stool a greenish or grayish black. But internal bleeding can also cause dark stool. So it's important to tell your doctor about any color changes.   ? Call your doctor if you think you are having a problem with your iron pills. Even after you start to feel better, it will take several months for your body to build up its supply of iron. ? If you miss a pill, don't take a double dose. ? Keep iron pills out of the reach of small children. Too much iron can be very dangerous. · Eat foods with a lot of iron. These include red meat, shellfish, poultry, and eggs. They also include beans, raisins, whole-grain bread, and leafy green vegetables. · Steam your vegetables. This is the best way to prepare them if you want to get as much iron as possible. · Be safe with medicines. Do not take nonsteroidal anti-inflammatory pain relievers unless your doctor tells you to. These include aspirin, naproxen (Aleve), and ibuprofen (Advil, Motrin). · Liquid iron can stain your teeth. But you can mix it with water or juice and drink it with a straw. Then it won't get on your teeth. When should you call for help? Call 911 anytime you think you may need emergency care. For example, call if:    · You passed out (lost consciousness). Call your doctor now or seek immediate medical care if:    · You are short of breath.     · You are dizzy or light-headed, or you feel like you may faint.     · You have new or worse bleeding. Watch closely for changes in your health, and be sure to contact your doctor if:    · You feel weaker or more tired than usual.     · You do not get better as expected. Where can you learn more? Go to https://Curbsyramakrishna.RaisedDigital. org and sign in to your PrecisionDemand account. Enter U748 in the [facility] box to learn more about \"Iron Deficiency Anemia: Care Instructions. \"     If you do not have an account, please click on the \"Sign Up Now\" link. Current as of: September 23, 2020               Content Version: 12.9  © 7650-0293 Healthwise, Incorporated. Care instructions adapted under license by [facility] ([facility]).  If you have questions about a medical condition or this instruction, always ask your healthcare professional. [address] any warranty or liability for your use of this information.

## 2021-08-19 NOTE — TELEPHONE ENCOUNTER
Provider: Dr Joshua Juarez   Has the patient been vaccinated against COVID? No   Procedure: egd   Sedation: mac   Type of prep: npo after midnight   Location: [place]   Prep instructions provided to patient during office visit - yes.      [facility] Monday
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
## 2021-08-20 ENCOUNTER — IMMUNIZATION (OUTPATIENT)
Dept: PRIMARY CARE CLINIC | Age: 31
End: 2021-08-20
Payer: MEDICAID

## 2021-08-20 PROCEDURE — 91300 COVID-19, PFIZER VACCINE 30MCG/0.3ML DOSE: CPT | Performed by: FAMILY MEDICINE

## 2021-08-20 PROCEDURE — 0001A COVID-19, PFIZER VACCINE 30MCG/0.3ML DOSE: CPT | Performed by: FAMILY MEDICINE

## 2021-09-01 NOTE — H&P
Interval H&P    I have reviewed the history on 8/19/21 and examined the patient. I find no relevant changes. I have reviewed with the patient the egd procedure    Esophagogastroduodenoscopy (EGD) with alternatives, rationale, benefits and risks, not limited to bleeding, infection, perforation, need of surgery, prolong hospital stay and anesthesia side effects were explained. Patient verbalized understanding and agrees to proceed with EGD.

## 2021-09-02 ENCOUNTER — HOSPITAL ENCOUNTER (OUTPATIENT)
Age: 31
Setting detail: OUTPATIENT SURGERY
Discharge: HOME | End: 2021-09-02
Attending: INTERNAL MEDICINE | Admitting: INTERNAL MEDICINE
Payer: MEDICAID

## 2021-09-02 ENCOUNTER — ANESTHESIA (OUTPATIENT)
Dept: ENDOSCOPY | Age: 31
End: 2021-09-02
Payer: MEDICAID

## 2021-09-02 ENCOUNTER — ANESTHESIA EVENT (OUTPATIENT)
Dept: ENDOSCOPY | Age: 31
End: 2021-09-02
Payer: MEDICAID

## 2021-09-02 VITALS
SYSTOLIC BLOOD PRESSURE: 109 MMHG | RESPIRATION RATE: 16 BRPM | OXYGEN SATURATION: 100 % | DIASTOLIC BLOOD PRESSURE: 53 MMHG

## 2021-09-02 VITALS
HEART RATE: 86 BPM | DIASTOLIC BLOOD PRESSURE: 71 MMHG | OXYGEN SATURATION: 98 % | RESPIRATION RATE: 14 BRPM | TEMPERATURE: 97 F | SYSTOLIC BLOOD PRESSURE: 122 MMHG | WEIGHT: 118 LBS | HEIGHT: 66 IN | BODY MASS INDEX: 18.96 KG/M2

## 2021-09-02 DIAGNOSIS — R10.11 RUQ PAIN: ICD-10-CM

## 2021-09-02 DIAGNOSIS — D64.9 ANEMIA, UNSPECIFIED TYPE: ICD-10-CM

## 2021-09-02 LAB — PREGNANCY, URINE: NEGATIVE

## 2021-09-02 PROCEDURE — 88305 TISSUE EXAM BY PATHOLOGIST: CPT

## 2021-09-02 PROCEDURE — 2500000003 HC RX 250 WO HCPCS: Performed by: NURSE ANESTHETIST, CERTIFIED REGISTERED

## 2021-09-02 PROCEDURE — 6360000002 HC RX W HCPCS: Performed by: NURSE ANESTHETIST, CERTIFIED REGISTERED

## 2021-09-02 PROCEDURE — 3609012400 HC EGD TRANSORAL BIOPSY SINGLE/MULTIPLE: Performed by: INTERNAL MEDICINE

## 2021-09-02 PROCEDURE — 7100000010 HC PHASE II RECOVERY - FIRST 15 MIN: Performed by: INTERNAL MEDICINE

## 2021-09-02 PROCEDURE — 3700000000 HC ANESTHESIA ATTENDED CARE: Performed by: INTERNAL MEDICINE

## 2021-09-02 PROCEDURE — 7100000011 HC PHASE II RECOVERY - ADDTL 15 MIN: Performed by: INTERNAL MEDICINE

## 2021-09-02 PROCEDURE — 2580000003 HC RX 258: Performed by: INTERNAL MEDICINE

## 2021-09-02 PROCEDURE — 3700000001 HC ADD 15 MINUTES (ANESTHESIA): Performed by: INTERNAL MEDICINE

## 2021-09-02 PROCEDURE — 84703 CHORIONIC GONADOTROPIN ASSAY: CPT

## 2021-09-02 PROCEDURE — 43239 EGD BIOPSY SINGLE/MULTIPLE: CPT | Performed by: INTERNAL MEDICINE

## 2021-09-02 PROCEDURE — 2709999900 HC NON-CHARGEABLE SUPPLY: Performed by: INTERNAL MEDICINE

## 2021-09-02 RX ORDER — ONDANSETRON 2 MG/ML
INJECTION INTRAMUSCULAR; INTRAVENOUS PRN
Status: DISCONTINUED | OUTPATIENT
Start: 2021-09-02 | End: 2021-09-02 | Stop reason: SDUPTHER

## 2021-09-02 RX ORDER — LIDOCAINE HYDROCHLORIDE 20 MG/ML
INJECTION, SOLUTION EPIDURAL; INFILTRATION; INTRACAUDAL; PERINEURAL PRN
Status: DISCONTINUED | OUTPATIENT
Start: 2021-09-02 | End: 2021-09-02 | Stop reason: SDUPTHER

## 2021-09-02 RX ORDER — PROPOFOL 10 MG/ML
INJECTION, EMULSION INTRAVENOUS PRN
Status: DISCONTINUED | OUTPATIENT
Start: 2021-09-02 | End: 2021-09-02 | Stop reason: SDUPTHER

## 2021-09-02 RX ORDER — SODIUM CHLORIDE, SODIUM LACTATE, POTASSIUM CHLORIDE, CALCIUM CHLORIDE 600; 310; 30; 20 MG/100ML; MG/100ML; MG/100ML; MG/100ML
INJECTION, SOLUTION INTRAVENOUS CONTINUOUS
Status: DISCONTINUED | OUTPATIENT
Start: 2021-09-02 | End: 2021-09-02 | Stop reason: HOSPADM

## 2021-09-02 RX ADMIN — SODIUM CHLORIDE, POTASSIUM CHLORIDE, SODIUM LACTATE AND CALCIUM CHLORIDE: 600; 310; 30; 20 INJECTION, SOLUTION INTRAVENOUS at 09:36

## 2021-09-02 RX ADMIN — ONDANSETRON 4 MG: 2 INJECTION, SOLUTION INTRAMUSCULAR; INTRAVENOUS at 09:39

## 2021-09-02 RX ADMIN — LIDOCAINE HYDROCHLORIDE 100 MG: 20 INJECTION, SOLUTION EPIDURAL; INFILTRATION; INTRACAUDAL; PERINEURAL at 09:50

## 2021-09-02 RX ADMIN — PROPOFOL 250 MG: 10 INJECTION, EMULSION INTRAVENOUS at 09:50

## 2021-09-02 ASSESSMENT — PAIN - FUNCTIONAL ASSESSMENT: PAIN_FUNCTIONAL_ASSESSMENT: 0-10

## 2021-09-02 NOTE — PROGRESS NOTES
.Patient left the floor in stable condition to home with sister and all belongings. Rachel Srivastava RN

## 2021-09-02 NOTE — OP NOTE
[address] ,  [address] [facility]  Phone: [phone]  [facility],  [address] [place] [address], [address] [place] [address]  Phone: [phone]   [facility]:[phone]    EGD Procedure Note    Patient: Christopher Hughes  : 1990    Procedure: Esophagogastroduodenoscopy with biopsy    Date:  2021     Endoscopist:  Berkley Fiore MD    Referring Physician:  ANAND Marie CNP    Preoperative Diagnosis:  RUQ pain [R10.11]  Anemia, unspecified type [D64.9]    Anesthesia: Anesthesia: MAC  Sedation: Propofol per anesthesia  Start Time: 952  Stop Time: 957  ASA Class: 1  Mallampati: I (soft palate, uvula, fauces, tonsillar pillars visible)    Indications: This is a 36y.o. year old female with medical history of complex regional pain syndrome status post spinal cord nerve stimulator,  iron deficiency anemia, status post laparoscopic cholecystectomy 2020 seen independently with referral for right upper quadrant abdominal pain. Labs from 2021 reviewed with normocytic anemia, hemoglobin 11.3 g/dL, hematocrit 35.5, MCV 85.4, RDW 16, platelets 646; unremarkable liver chemistries, normal lipase 29 and unremarkable BMP. Ultrasound around 2021 was negative for acute abnormality; CBD 6 mm. Procedure Details  Informed consent was obtained for the procedure, including conscious sedation. Risks of pancreatitis, infection, perforation, hemorrhage, adverse drug reaction and aspiration were discussed. The patient was placed in the left lateral decubitus position. Based on the pre-procedure assessment, including review of the patient's medical history, medications, allergies, and review of systems, she had been deemed to be an appropriate candidate for conscious sedation; she was therefore sedated with the medications listed above. She was monitored continuously with ECG tracing, pulse oximetry, blood pressure monitoring, and direct observation.  A gastroscope was inserted into the mouth and advanced under direct vision to second portion of the duodenum. A careful inspection was made as the gastroscope was withdrawn, including a retroflexed view of the proximal stomach including views of the incisura and cardia; findings and interventions are described below. Appropriate photodocumentation Was Obtained. If photos taken, they were ordered to be scanned into the medical record. Findings:  Normal upper and mid esophagus  Regular Z-line at 40 cm from incisors  Mild erythematous mucosa in antrum and body of stomach suggestive of mild gastritis. Biopsies taken from antrum, incisura and body of stomach to evaluate for H. Pylori. Normal duodenal bulb and second portion of duodenum. Biopsies taken to evaluate for Celiac disease. Specimens: Was Obtained    Complications:   None; patient tolerated the procedure well. Disposition:   PACU - hemodynamically stable. Estimated Blood loss:  none    Impression:   Normal upper and mid esophagus  Regular Z-line at 40 cm from incisors  Mild gastritis. Biopsied  Normal duodenal bulb and second portion of duodenum. Biopsied    Recommendations:  -Continue acid suppression. ,   -Await pathology. ,   -Follow symptoms. ,   -Follow up with me in 4-6 weeks.          Bertha Costello MD 9/2/21 9:58 AM EDT
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
## 2021-09-02 NOTE — PROGRESS NOTES
Bedside report received from [address] and Fatou Quiles CRNA pt sleepy easy to arouse vitals WNL to pre-op baseline. Kathia Ceballos RN

## 2021-09-02 NOTE — ANESTHESIA POSTPROCEDURE EVALUATION
Department of Anesthesiology  Postprocedure Note    Patient: Rosemary Cole  MRN: [id number]  YOB: 1990  Date of evaluation: 9/2/2021  Time:  10:43 AM     Procedure Summary     Date: 09/02/21 Room / Location: [place] [address] / [facility]    Anesthesia Start: 1375 Anesthesia Stop: 1001    Procedure: EGD BIOPSY (N/A ) Diagnosis:       RUQ pain      Anemia, unspecified type      (RUQ PAIN, ANEMIA)    Surgeons: King Sandi MD Responsible Provider: Lasha Cannon MD    Anesthesia Type: TIVA ASA Status: 2          Anesthesia Type: TIVA    Eber Phase I: Eber Score: 10    Eber Phase II: Eber Score: 10    Last vitals: Reviewed and per EMR flowsheets.        Anesthesia Post Evaluation    Patient location during evaluation: PACU  Level of consciousness: awake  Airway patency: patent  Nausea & Vomiting: no nausea  Complications: no  Cardiovascular status: blood pressure returned to baseline  Respiratory status: acceptable  Hydration status: euvolemic

## 2021-09-07 ENCOUNTER — TELEPHONE (OUTPATIENT)
Dept: GASTROENTEROLOGY | Age: 31
End: 2021-09-07

## 2021-09-07 DIAGNOSIS — Z01.812 PRE-PROCEDURE LAB EXAM: Primary | ICD-10-CM

## 2021-09-07 NOTE — TELEPHONE ENCOUNTER
----- Message from Alma Cantu MD sent at 9/7/2021  9:00 AM EDT -----  Ok to discontinue protonix if concern for osteoporosis and absence of gastritis on EGD. Schedule for upper EUS to check for biliary sludge as cause of RUQ pain.

## 2021-09-26 ENCOUNTER — PATIENT MESSAGE (OUTPATIENT)
Dept: INTERNAL MEDICINE CLINIC | Age: 31
End: 2021-09-26

## 2021-09-27 RX ORDER — EPINEPHRINE 0.3 MG/.3ML
0.3 INJECTION SUBCUTANEOUS ONCE
Qty: 0.3 ML | Refills: 0 | Status: SHIPPED | OUTPATIENT
Start: 2021-09-27 | End: 2021-10-01

## 2021-09-27 NOTE — TELEPHONE ENCOUNTER
From: Cecelia Candelario  To: Parish Cardenas, ANAND - CNP  Sent: 9/26/2021 1:40 PM EDT  Subject: Prescription Question    Abhijit Bustillo,    I need a refill of my epi pen. The bees are really bad this year.     Thank you  Sincerely,  Hoda Gunter
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
## 2021-10-01 ENCOUNTER — ANESTHESIA EVENT (OUTPATIENT)
Dept: ENDOSCOPY | Age: 31
End: 2021-10-01
Payer: MEDICAID

## 2021-10-01 ENCOUNTER — ANESTHESIA (OUTPATIENT)
Dept: ENDOSCOPY | Age: 31
End: 2021-10-01
Payer: MEDICAID

## 2021-10-01 ENCOUNTER — HOSPITAL ENCOUNTER (OUTPATIENT)
Age: 31
Setting detail: OUTPATIENT SURGERY
Discharge: HOME | End: 2021-10-01
Attending: INTERNAL MEDICINE | Admitting: INTERNAL MEDICINE
Payer: MEDICAID

## 2021-10-01 VITALS — DIASTOLIC BLOOD PRESSURE: 56 MMHG | OXYGEN SATURATION: 100 % | SYSTOLIC BLOOD PRESSURE: 101 MMHG

## 2021-10-01 VITALS
SYSTOLIC BLOOD PRESSURE: 106 MMHG | TEMPERATURE: 97.7 F | HEART RATE: 81 BPM | WEIGHT: 118 LBS | RESPIRATION RATE: 18 BRPM | DIASTOLIC BLOOD PRESSURE: 71 MMHG | BODY MASS INDEX: 18.96 KG/M2 | HEIGHT: 66 IN | OXYGEN SATURATION: 100 %

## 2021-10-01 LAB — PREGNANCY, URINE: NEGATIVE

## 2021-10-01 PROCEDURE — 3700000001 HC ADD 15 MINUTES (ANESTHESIA): Performed by: INTERNAL MEDICINE

## 2021-10-01 PROCEDURE — 7100000011 HC PHASE II RECOVERY - ADDTL 15 MIN: Performed by: INTERNAL MEDICINE

## 2021-10-01 PROCEDURE — 2709999900 HC NON-CHARGEABLE SUPPLY: Performed by: INTERNAL MEDICINE

## 2021-10-01 PROCEDURE — 6360000002 HC RX W HCPCS: Performed by: NURSE ANESTHETIST, CERTIFIED REGISTERED

## 2021-10-01 PROCEDURE — 43242 EGD US FINE NEEDLE BX/ASPIR: CPT | Performed by: INTERNAL MEDICINE

## 2021-10-01 PROCEDURE — 3609018500 HC EGD US SCOPE W/ADJACENT STRUCTURES: Performed by: INTERNAL MEDICINE

## 2021-10-01 PROCEDURE — 84703 CHORIONIC GONADOTROPIN ASSAY: CPT

## 2021-10-01 PROCEDURE — 7100000010 HC PHASE II RECOVERY - FIRST 15 MIN: Performed by: INTERNAL MEDICINE

## 2021-10-01 PROCEDURE — 3700000000 HC ANESTHESIA ATTENDED CARE: Performed by: INTERNAL MEDICINE

## 2021-10-01 RX ORDER — PROPOFOL 10 MG/ML
INJECTION, EMULSION INTRAVENOUS PRN
Status: DISCONTINUED | OUTPATIENT
Start: 2021-10-01 | End: 2021-10-02 | Stop reason: SDUPTHER

## 2021-10-01 RX ORDER — SODIUM CHLORIDE, SODIUM LACTATE, POTASSIUM CHLORIDE, CALCIUM CHLORIDE 600; 310; 30; 20 MG/100ML; MG/100ML; MG/100ML; MG/100ML
INJECTION, SOLUTION INTRAVENOUS CONTINUOUS
Status: DISCONTINUED | OUTPATIENT
Start: 2021-10-01 | End: 2021-10-01 | Stop reason: HOSPADM

## 2021-10-01 RX ORDER — ONDANSETRON 2 MG/ML
INJECTION INTRAMUSCULAR; INTRAVENOUS PRN
Status: DISCONTINUED | OUTPATIENT
Start: 2021-10-01 | End: 2021-10-02 | Stop reason: SDUPTHER

## 2021-10-01 RX ADMIN — PROPOFOL 50 MG: 10 INJECTION, EMULSION INTRAVENOUS at 12:31

## 2021-10-01 RX ADMIN — PROPOFOL 50 MG: 10 INJECTION, EMULSION INTRAVENOUS at 12:29

## 2021-10-01 RX ADMIN — PROPOFOL 50 MG: 10 INJECTION, EMULSION INTRAVENOUS at 12:38

## 2021-10-01 RX ADMIN — PROPOFOL 50 MG: 10 INJECTION, EMULSION INTRAVENOUS at 12:48

## 2021-10-01 RX ADMIN — ONDANSETRON 4 MG: 2 INJECTION, SOLUTION INTRAMUSCULAR; INTRAVENOUS at 12:21

## 2021-10-01 RX ADMIN — PROPOFOL 50 MG: 10 INJECTION, EMULSION INTRAVENOUS at 12:44

## 2021-10-01 RX ADMIN — PROPOFOL 100 MG: 10 INJECTION, EMULSION INTRAVENOUS at 12:26

## 2021-10-01 RX ADMIN — PROPOFOL 50 MG: 10 INJECTION, EMULSION INTRAVENOUS at 12:33

## 2021-10-01 RX ADMIN — PROPOFOL 50 MG: 10 INJECTION, EMULSION INTRAVENOUS at 12:36

## 2021-10-01 RX ADMIN — PROPOFOL 50 MG: 10 INJECTION, EMULSION INTRAVENOUS at 12:40

## 2021-10-01 ASSESSMENT — PULMONARY FUNCTION TESTS
PIF_VALUE: 1
PIF_VALUE: 0
PIF_VALUE: 1
PIF_VALUE: 0
PIF_VALUE: 1
PIF_VALUE: 0
PIF_VALUE: 1
PIF_VALUE: 0
PIF_VALUE: 1

## 2021-10-01 ASSESSMENT — PAIN DESCRIPTION - DESCRIPTORS: DESCRIPTORS: SHARP

## 2021-10-01 ASSESSMENT — PAIN - FUNCTIONAL ASSESSMENT: PAIN_FUNCTIONAL_ASSESSMENT: 0-10

## 2021-10-01 NOTE — OP NOTE
[facility]    [address] [place] [address] ,  [address] [facility]  Phone: [phone]   ACW:[phone]    Upper Endoscopic Ultrasound with Fine Needle Aspiration Procedure Note    Patient: Johanna Lazaro  : 1990    Procedure: Upper EUS     Date:  10/1/2021     Endoscopist:  Ofelia Pruitt MD, MD    Referring Physician:  ANAND Lane - CNP    Preoperative Diagnosis:  RUQ PAIN    Postoperative Diagnosis:  RUQ PAIN    Anesthesia: Anesthesia: MAC  ASA Class: 2  Mallampati: II (soft palate, uvula, fauces visible)    Indications: This is a 36y.o. year old female with medical history of complex regional pain syndrome status post spinal cord nerve stimulator,  iron deficiency anemia, status post laparoscopic cholecystectomy 2020 seen independently with referral for right upper quadrant abdominal pain. Labs from 2021 reviewed with normocytic anemia, hemoglobin 11.3 g/dL, hematocrit 35.5, MCV 85.4, RDW 16, platelets 489; [id number] liver chemistries, normal lipase 29 and unremarkable BMP.   Ultrasound around 2021 was negative for acute abnormality; CBD 6 mm. EGD 21 was unremarkable. Procedure Details   Prior to the procedure, a history and physical was performed, and patient medications and allergies were reviewed. The patient's tolerance of previous anesthesia was also reviewed. Informed consent was obtained for the procedure, including anesthesia, risks of infection, perforation, hemorrhage, pancreatitis, adverse drug reaction were discussed. All questions were answered, and informed consent was obtained. Prior anticoagulants: no previous anticoagulant or antiplatelet agents. After obtaining informed consent, the duodenoscope was passed. She was monitored continuously with ECG tracing, pulse oximetry, blood pressure monitoring, and direct observation. Appropriate photodocumentation Was Obtained.   If photos taken, they were ordered to be scanned into the medical record. The EUS was accomplished with ease. The patient tolerated the procedure well. On the endoscopic exam, an standard upper endoscope was inserted into the mouth and passed to the second portion of duodenum to evaluate the upper gi tract. On the ultrasonographic examination, the linear echoendoscope was advanced through the mouth into stomach and duodenum. Water was used as an echogenic medium. EUS Findings:  No sonographic abnormality in the left lobe of liver. Aorta and celiac axis were identified. There was no evidence of celiac lymphadenopathy. Absent gallbladder body. No sonographic abnormality in the common bile duct. The common bile duct measured 2.5 mm with smooth tapering to the papilla. No stones, sludge or stricture. The pancreas body and tail were visualized carefully with normal echotexure. The pancreatic duct measured 0.8 mm in the body as it coursed towards the tail. Splenic artery and splenic vein appeared normal.  Left kidney and spleen were visualized to be normal.    The portal vein and SMV appeared normal.    Ampulla appeared normal sonographically and endoscopically. Specimens: None           Complications:\"None; patient tolerated the procedure well. \"    Estimated blood loss: None           Disposition: PACU - hemodynamically stable. Condition: stable    Impression:    Absent gallbladder body. No sonographic abnormality in the common bile duct. The common bile duct measured 2.5 mm with smooth tapering to the papilla. No stones, sludge or stricture. The pancreas body and tail were visualized with normal echotexure. The pancreatic duct measured 0.8 mm in the body as it coursed towards the tail. Ampulla appeared normal sonographically and endoscopically. Recommendations:  -Continue acid suppression. ,   -Follow symptoms. ,   -Follow up with primary care physician        Gina Forrester MD, MD. 10/1/21 12:54 PM EDT
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
## 2021-10-03 NOTE — ANESTHESIA POSTPROCEDURE EVALUATION
Output:  No intake/output data recorded. Nausea & Vomiting:  No    Level of Consciousness:  Awake    Pain Assessment:  Adequate analgesia    Anesthesia Complications:  No apparent anesthetic complications    SUMMARY      Vital signs stable  OK to discharge from Stage I post anesthesia care.   Care transferred from Anesthesiology department on discharge from perioperative area

## 2021-10-25 ENCOUNTER — OFFICE VISIT (OUTPATIENT)
Dept: GYNECOLOGY | Age: 31
End: 2021-10-25
Payer: MEDICAID

## 2021-10-25 VITALS
BODY MASS INDEX: 18.96 KG/M2 | WEIGHT: 118 LBS | HEART RATE: 85 BPM | HEIGHT: 66 IN | OXYGEN SATURATION: 98 % | SYSTOLIC BLOOD PRESSURE: 100 MMHG | DIASTOLIC BLOOD PRESSURE: 68 MMHG | RESPIRATION RATE: 17 BRPM

## 2021-10-25 DIAGNOSIS — Z01.419 WELL WOMAN EXAM WITH ROUTINE GYNECOLOGICAL EXAM: Primary | ICD-10-CM

## 2021-10-25 PROCEDURE — 99395 PREV VISIT EST AGE 18-39: CPT | Performed by: OBSTETRICS & GYNECOLOGY

## 2021-10-25 PROCEDURE — G8484 FLU IMMUNIZE NO ADMIN: HCPCS | Performed by: OBSTETRICS & GYNECOLOGY

## 2021-10-25 ASSESSMENT — ENCOUNTER SYMPTOMS
RESPIRATORY NEGATIVE: 1
ABDOMINAL PAIN: 1
EYES NEGATIVE: 1
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
## 2021-11-03 ENCOUNTER — TELEPHONE (OUTPATIENT)
Dept: INTERNAL MEDICINE CLINIC | Age: 31
End: 2021-11-03
Payer: MEDICAID

## 2021-11-03 DIAGNOSIS — J02.0 PHARYNGITIS DUE TO STREPTOCOCCUS SPECIES: ICD-10-CM

## 2021-11-03 DIAGNOSIS — R68.89 FLU-LIKE SYMPTOMS: Primary | ICD-10-CM

## 2021-11-03 PROCEDURE — 87880 STREP A ASSAY W/OPTIC: CPT | Performed by: NURSE PRACTITIONER

## 2021-11-03 NOTE — TELEPHONE ENCOUNTER
----- Message from Jacob Obregon sent at 11/3/2021 10:14 AM EDT -----  Subject: Message to Provider    QUESTIONS  Information for Provider? pt. is currently having symptoms of the   following? sore throat, low grade fever, runny nose. Pt. would like to   come in for a covid test and a strep throat test. Please advise  ---------------------------------------------------------------------------  --------------  CALL BACK INFO  What is the best way for the office to contact you? OK to leave message on   voicemail  Preferred Call Back Phone Number? [phone]  ---------------------------------------------------------------------------  --------------  SCRIPT ANSWERS  Relationship to Patient?  Self

## 2021-11-08 RX ORDER — AMOXICILLIN AND CLAVULANATE POTASSIUM 875; 125 MG/1; MG/1
1 TABLET, FILM COATED ORAL 2 TIMES DAILY
Qty: 14 TABLET | Refills: 0 | Status: SHIPPED | OUTPATIENT
Start: 2021-11-08 | End: 2021-11-15

## 2021-11-12 ENCOUNTER — HOSPITAL ENCOUNTER (EMERGENCY)
Age: 31
Discharge: HOME | End: 2021-11-13
Attending: EMERGENCY MEDICINE
Payer: MEDICAID

## 2021-11-12 DIAGNOSIS — M79.10 MYALGIA: Primary | ICD-10-CM

## 2021-11-12 PROCEDURE — 99285 EMERGENCY DEPT VISIT HI MDM: CPT

## 2021-11-12 RX ORDER — DIPHENHYDRAMINE HCL 25 MG
50 TABLET ORAL ONCE
Status: COMPLETED | OUTPATIENT
Start: 2021-11-13 | End: 2021-11-13

## 2021-11-12 RX ORDER — PREDNISONE 20 MG/1
TABLET ORAL
COMMUNITY
Start: 2021-11-08 | End: 2022-01-20

## 2021-11-12 RX ORDER — ALBUTEROL SULFATE 90 UG/1
AEROSOL, METERED RESPIRATORY (INHALATION)
COMMUNITY
Start: 2021-11-08

## 2021-11-12 RX ORDER — FAMOTIDINE 20 MG/1
20 TABLET, FILM COATED ORAL ONCE
Status: COMPLETED | OUTPATIENT
Start: 2021-11-13 | End: 2021-11-13

## 2021-11-12 RX ORDER — LEVOFLOXACIN 750 MG/1
TABLET ORAL
COMMUNITY
Start: 2021-11-08 | End: 2022-01-20

## 2021-11-12 RX ORDER — DIPHENHYDRAMINE HYDROCHLORIDE 50 MG/ML
25 INJECTION INTRAMUSCULAR; INTRAVENOUS ONCE
Status: DISCONTINUED | OUTPATIENT
Start: 2021-11-13 | End: 2021-11-12

## 2021-11-12 ASSESSMENT — PAIN DESCRIPTION - LOCATION: LOCATION: GENERALIZED

## 2021-11-12 ASSESSMENT — PAIN DESCRIPTION - FREQUENCY: FREQUENCY: CONTINUOUS

## 2021-11-12 ASSESSMENT — PAIN DESCRIPTION - PAIN TYPE: TYPE: ACUTE PAIN

## 2021-11-12 ASSESSMENT — PAIN DESCRIPTION - DESCRIPTORS: DESCRIPTORS: PINS AND NEEDLES

## 2021-11-12 ASSESSMENT — PAIN SCALES - GENERAL: PAINLEVEL_OUTOF10: 8

## 2021-11-13 ENCOUNTER — APPOINTMENT (OUTPATIENT)
Dept: GENERAL RADIOLOGY | Age: 31
End: 2021-11-13
Payer: MEDICAID

## 2021-11-13 VITALS
HEART RATE: 80 BPM | TEMPERATURE: 97.9 F | DIASTOLIC BLOOD PRESSURE: 80 MMHG | BODY MASS INDEX: 18.96 KG/M2 | WEIGHT: 118 LBS | RESPIRATION RATE: 18 BRPM | OXYGEN SATURATION: 98 % | HEIGHT: 66 IN | SYSTOLIC BLOOD PRESSURE: 110 MMHG

## 2021-11-13 LAB
RAPID INFLUENZA  B AGN: NEGATIVE
RAPID INFLUENZA A AGN: NEGATIVE
SARS-COV-2, PCR: NOT DETECTED

## 2021-11-13 PROCEDURE — 71045 X-RAY EXAM CHEST 1 VIEW: CPT

## 2021-11-13 PROCEDURE — 87804 INFLUENZA ASSAY W/OPTIC: CPT

## 2021-11-13 PROCEDURE — U0003 INFECTIOUS AGENT DETECTION BY NUCLEIC ACID (DNA OR RNA); SEVERE ACUTE RESPIRATORY SYNDROME CORONAVIRUS 2 (SARS-COV-2) (CORONAVIRUS DISEASE [COVID-19]), AMPLIFIED PROBE TECHNIQUE, MAKING USE OF HIGH THROUGHPUT TECHNOLOGIES AS DESCRIBED BY CMS-2020-01-R: HCPCS

## 2021-11-13 PROCEDURE — 6370000000 HC RX 637 (ALT 250 FOR IP): Performed by: NURSE PRACTITIONER

## 2021-11-13 PROCEDURE — U0005 INFEC AGEN DETEC AMPLI PROBE: HCPCS

## 2021-11-13 RX ORDER — KETOROLAC TROMETHAMINE 30 MG/ML
15 INJECTION, SOLUTION INTRAMUSCULAR; INTRAVENOUS ONCE
Status: DISCONTINUED | OUTPATIENT
Start: 2021-11-13 | End: 2021-11-13 | Stop reason: HOSPADM

## 2021-11-13 RX ORDER — KETOROLAC TROMETHAMINE 30 MG/ML
15 INJECTION, SOLUTION INTRAMUSCULAR; INTRAVENOUS ONCE
Status: DISCONTINUED | OUTPATIENT
Start: 2021-11-13 | End: 2021-11-13

## 2021-11-13 RX ADMIN — DIPHENHYDRAMINE HYDROCHLORIDE 50 MG: 25 TABLET ORAL at 00:02

## 2021-11-13 RX ADMIN — FAMOTIDINE 20 MG: 20 TABLET, FILM COATED ORAL at 00:02

## 2021-11-13 ASSESSMENT — ENCOUNTER SYMPTOMS
RESPIRATORY NEGATIVE: 1
DIARRHEA: 0
ABDOMINAL PAIN: 0
VOMITING: 0
NAUSEA: 0
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
## 2021-11-13 NOTE — ED PROVIDER NOTES
ED Attending Attestation Note     Date of evaluation: 11/12/2021    This patient was seen by the advance practice provider. I have seen and examined the patient, agree with the workup, evaluation, management and diagnosis. The care plan has been discussed. My assessment reveals a well-appearing woman in no acute distress with soft compartments in her upper and lower extremities, clear lungs bilaterally, regular rate and rhythm, soft, nontender abdomen.          Stephane Serna MD  11/13/21 [id number]

## 2021-12-01 ENCOUNTER — HOSPITAL ENCOUNTER (EMERGENCY)
Age: 31
Discharge: HOME | End: 2021-12-01
Attending: EMERGENCY MEDICINE
Payer: MEDICAID

## 2021-12-01 VITALS
SYSTOLIC BLOOD PRESSURE: 133 MMHG | OXYGEN SATURATION: 97 % | DIASTOLIC BLOOD PRESSURE: 80 MMHG | RESPIRATION RATE: 18 BRPM | TEMPERATURE: 98.2 F | HEART RATE: 99 BPM

## 2021-12-01 DIAGNOSIS — M62.838 MUSCLE SPASM OF RIGHT LOWER EXTREMITY: Primary | ICD-10-CM

## 2021-12-01 PROCEDURE — 99283 EMERGENCY DEPT VISIT LOW MDM: CPT

## 2021-12-02 ENCOUNTER — HOSPITAL ENCOUNTER (OUTPATIENT)
Dept: VASCULAR LAB | Age: 31
Discharge: HOME | End: 2021-12-02
Payer: MEDICAID

## 2021-12-02 DIAGNOSIS — M62.838 MUSCLE SPASM OF RIGHT LOWER EXTREMITY: ICD-10-CM

## 2021-12-02 PROCEDURE — 93971 EXTREMITY STUDY: CPT

## 2021-12-02 ASSESSMENT — ENCOUNTER SYMPTOMS
GASTROINTESTINAL NEGATIVE: 1
RESPIRATORY NEGATIVE: 1
EYES NEGATIVE: 1
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
## 2022-01-03 ENCOUNTER — TELEPHONE (OUTPATIENT)
Dept: INTERNAL MEDICINE CLINIC | Age: 32
End: 2022-01-03

## 2022-01-03 NOTE — TELEPHONE ENCOUNTER
Fever (102.2), Migraine, Fatigue, Cough, Chest congestion    Would like to know about the COVID Infusion? Would like to speak with Reji Navarro about this. Sx started on Sunday.

## 2022-01-20 ENCOUNTER — OFFICE VISIT (OUTPATIENT)
Dept: INTERNAL MEDICINE CLINIC | Age: 32
End: 2022-01-20
Payer: MEDICAID

## 2022-01-20 VITALS
TEMPERATURE: 96.9 F | BODY MASS INDEX: 20.41 KG/M2 | WEIGHT: 127 LBS | DIASTOLIC BLOOD PRESSURE: 60 MMHG | HEART RATE: 97 BPM | SYSTOLIC BLOOD PRESSURE: 110 MMHG | OXYGEN SATURATION: 98 % | HEIGHT: 66 IN

## 2022-01-20 DIAGNOSIS — J06.9 VIRAL UPPER RESPIRATORY TRACT INFECTION: ICD-10-CM

## 2022-01-20 DIAGNOSIS — R00.2 PALPITATIONS: Primary | ICD-10-CM

## 2022-01-20 PROCEDURE — 1036F TOBACCO NON-USER: CPT | Performed by: NURSE PRACTITIONER

## 2022-01-20 PROCEDURE — 99214 OFFICE O/P EST MOD 30 MIN: CPT | Performed by: NURSE PRACTITIONER

## 2022-01-20 PROCEDURE — G8484 FLU IMMUNIZE NO ADMIN: HCPCS | Performed by: NURSE PRACTITIONER

## 2022-01-20 PROCEDURE — G8420 CALC BMI NORM PARAMETERS: HCPCS | Performed by: NURSE PRACTITIONER

## 2022-01-20 PROCEDURE — G8427 DOCREV CUR MEDS BY ELIG CLIN: HCPCS | Performed by: NURSE PRACTITIONER

## 2022-01-20 SDOH — ECONOMIC STABILITY: FOOD INSECURITY: WITHIN THE PAST 12 MONTHS, THE FOOD YOU BOUGHT JUST DIDN'T LAST AND YOU DIDN'T HAVE MONEY TO GET MORE.: NEVER TRUE

## 2022-01-20 SDOH — ECONOMIC STABILITY: FOOD INSECURITY: WITHIN THE PAST 12 MONTHS, YOU WORRIED THAT YOUR FOOD WOULD RUN OUT BEFORE YOU GOT MONEY TO BUY MORE.: NEVER TRUE

## 2022-01-20 ASSESSMENT — ENCOUNTER SYMPTOMS
WHEEZING: 0
SINUS PAIN: 0
COLOR CHANGE: 0
DIARRHEA: 0
SINUS PRESSURE: 1
SHORTNESS OF BREATH: 0
CONSTIPATION: 0
BACK PAIN: 0
COUGH: 0
ABDOMINAL PAIN: 0

## 2022-01-20 ASSESSMENT — SOCIAL DETERMINANTS OF HEALTH (SDOH): HOW HARD IS IT FOR YOU TO PAY FOR THE VERY BASICS LIKE FOOD, HOUSING, MEDICAL CARE, AND HEATING?: NOT HARD AT ALL

## 2022-01-20 NOTE — PROGRESS NOTES
Monica Subramanian (:  1990) is a 36 y.o. female,Established patient, here for evaluation of the following chief complaint(s): Other (had covid and pneumonia and a sinus infection. was in urgent care and needs follow-up.)      ASSESSMENT/PLAN:  1. Palpitations  Assessment & Plan:  Occasional palpitations, mild dizziness   Will check labs  Further recs pending labs     Orders:  -     Comprehensive Metabolic Panel; Future  -     CBC; Future  -     TSH with Reflex; Future  2. Viral upper respiratory tract infection  Assessment & Plan:  Continue Flonase and Mucinex  Symptoms gradually improving  Decongestants as needed if symptoms worsen      No follow-ups on file. SUBJECTIVE/OBJECTIVE:  HPI  Patient is here to follow-up on her COVID-19 infection. She was diagnosed about 2 weeks ago. She did go to urgent care this past weekend due to sinus pressure and ear pain. The symptoms have since improved. She was treated with a Z-Bunny on 22. She has been using Flonase and Mucinex which is of been helpful. She does report a few episodes of dizziness and fluttering sensation in her chest.  These only last a few seconds and then resolved. They are not necessarily coinciding. They are gradually improving. She did not have either of these prior to her COVID infection. Does feel she is drinking adequate water. Reports clear yellow urine.      Current Outpatient Medications   Medication Sig Dispense Refill    albuterol sulfate  (90 Base) MCG/ACT inhaler       tretinoin (RETIN-A) 0.025 % cream Apply pea-sized amount to entire face every other night increasing to nightly application as tolerated 45 g 2    ACZONE 5 % GEL Apply topically to affected area(s) on face QAM 30 g 2    nystatin (MYCOSTATIN) 930732 UNIT/GM cream Apply Twice daily for 1 week to corners of mouth, may use twice daily as needed for flares 30 g 2    Multiple Vitamins-Minerals (THERAPEUTIC MULTIVITAMIN-MINERALS) tablet Take 1 tablet by mouth daily      iron polysaccharides (FERREX 150) 150 MG capsule Take 1 capsule by mouth 2 times daily 60 capsule 3    amitriptyline (ELAVIL) 25 MG tablet Take 1 tablet by mouth nightly 30 tablet 0    EPINEPHrine (EPIPEN 2-DOMINIC) 0.3 MG/0.3ML SOAJ injection Inject 0.3 mLs into the skin once for 1 dose Use as directed for allergic reaction 0.3 mL 0     No current facility-administered medications for this visit. Review of Systems   Constitutional: Negative for chills, fatigue and fever. HENT: Positive for ear pain ( Improving, fullness) and sinus pressure. Negative for congestion and sinus pain. Respiratory: Negative for cough, shortness of breath and wheezing. Cardiovascular: Positive for palpitations. Negative for chest pain. Gastrointestinal: Negative for abdominal pain, constipation and diarrhea. Musculoskeletal: Negative for arthralgias, back pain and myalgias. Skin: Negative for color change, pallor and rash. Neurological: Positive for dizziness. Negative for syncope, weakness, light-headedness and headaches. Psychiatric/Behavioral: Negative for behavioral problems, confusion and sleep disturbance. The patient is not nervous/anxious. Vitals:    01/20/22 0906   BP: 110/60   Site: Right Upper Arm   Position: Sitting   Cuff Size: Medium Adult   Pulse: 97   Temp: 96.9 °F (36.1 °C)   SpO2: 98%   Weight: 127 lb (57.6 kg)   Height: 5' 6\" (1.676 m)       Physical Exam  Constitutional:       Appearance: She is well-developed. HENT:      Head: Normocephalic and atraumatic. Right Ear: Tympanic membrane normal.      Left Ear: Tympanic membrane normal.      Mouth/Throat:      Mouth: Mucous membranes are moist.      Pharynx: Oropharynx is clear. Eyes:      Conjunctiva/sclera: Conjunctivae normal.      Pupils: Pupils are equal, round, and reactive to light. Neck:      Thyroid: No thyromegaly. Vascular: No JVD.    Cardiovascular:      Rate and Rhythm: Normal rate and regular rhythm. Heart sounds: Normal heart sounds. Pulmonary:      Effort: Pulmonary effort is normal. No respiratory distress. Breath sounds: Normal breath sounds. No wheezing. Abdominal:      General: Bowel sounds are normal.      Palpations: Abdomen is soft. Musculoskeletal:         General: No deformity. Normal range of motion. Cervical back: Normal range of motion and neck supple. Skin:     General: Skin is warm and dry. Capillary Refill: Capillary refill takes less than 2 seconds. Neurological:      Mental Status: She is alert and oriented to person, place, and time. Psychiatric:         Behavior: Behavior normal.       An electronic signature was used to authenticate this note.     --ANAND Harris - CNP

## 2022-01-20 NOTE — ASSESSMENT & PLAN NOTE
Continue Flonase and Mucinex  Symptoms gradually improving  Decongestants as needed if symptoms worsen

## 2022-03-01 DIAGNOSIS — Z00.00 ROUTINE GENERAL MEDICAL EXAMINATION AT A HEALTH CARE FACILITY: Primary | ICD-10-CM

## 2022-03-14 DIAGNOSIS — Z00.00 ROUTINE GENERAL MEDICAL EXAMINATION AT A HEALTH CARE FACILITY: ICD-10-CM

## 2022-03-14 LAB — SARS-COV-2 ANTIBODY, TOTAL: POSITIVE

## 2022-06-06 ENCOUNTER — TELEPHONE (OUTPATIENT)
Dept: FAMILY MEDICINE CLINIC | Age: 32
End: 2022-06-06

## 2022-09-14 ENCOUNTER — TELEPHONE (OUTPATIENT)
Dept: FAMILY MEDICINE CLINIC | Age: 32
End: 2022-09-14

## 2022-09-20 DIAGNOSIS — K13.0 ANGULAR CHEILITIS: ICD-10-CM

## 2022-09-20 RX ORDER — NYSTATIN 100000 U/G
CREAM TOPICAL
Qty: 30 G | Refills: 2 | Status: SHIPPED | OUTPATIENT
Start: 2022-09-20

## (undated) DEVICE — 3M™ TEGADERM™ TRANSPARENT FILM DRESSING FRAME STYLE, 1627, 4 IN X 10 IN (10 CM X 25 CM), 20/CT 4CT/CASE: Brand: 3M™ TEGADERM™

## (undated) DEVICE — 1010 S-DRAPE TOWEL DRAPE 10/BX: Brand: STERI-DRAPE™

## (undated) DEVICE — TURNOVER KIT RM INF CTRL TECH

## (undated) DEVICE — STANDARD HYPODERMIC NEEDLE,POLYPROPYLENE HUB: Brand: MONOJECT

## (undated) DEVICE — ENDO CARRY-ON PROCEDURE KIT INCLUDES SUCTION TUBING, LUBRICANT, GAUZE, BIOHAZARD STICKER, TRANSPORT PAD AND INTERCEPT BEDSIDE KIT.: Brand: ENDO CARRY-ON PROCEDURE KIT

## (undated) DEVICE — ELECTRODE ECG MONITR FOAM TEAR DROP ADLT RED

## (undated) DEVICE — DRAPE C ARM UNIV W41XL74IN CLR PLAS XR VELC CLSR POLY STRP

## (undated) DEVICE — SPONGE LAP W18XL18IN WHT COT 4 PLY FLD STRUNG RADPQ DISP ST

## (undated) DEVICE — JEWISH HOSPITAL TURNOVER KIT: Brand: MEDLINE INDUSTRIES, INC.

## (undated) DEVICE — INTENDED FOR TISSUE SEPARATION, AND OTHER PROCEDURES THAT REQUIRE A SHARP SURGICAL BLADE TO PUNCTURE OR CUT.: Brand: BARD-PARKER ® CARBON RIB-BACK BLADES

## (undated) DEVICE — GARMENT,MEDLINE,DVT,INT,CALF,MED, GEN2: Brand: MEDLINE

## (undated) DEVICE — SUTURE VCRL SZ 3-0 L27IN ABSRB UD L26MM SH 1/2 CIR J416H

## (undated) DEVICE — ADHESIVE SKIN CLSR 0.7ML TOP DERMBND ADV

## (undated) DEVICE — DRAPE,LAP,CHOLE,W/TROUGHS,STERILE: Brand: MEDLINE

## (undated) DEVICE — SKIN AFFIX SURG ADHESIVE 72/CS 0.55ML: Brand: MEDLINE

## (undated) DEVICE — ELECTROSURGICAL PENCIL ROCKER SWITCH NON COATED BLADE ELECTRODE 10 FT (3 M) CORD HOLSTER: Brand: MEGADYNE

## (undated) DEVICE — SUTURE ETHBND EXCEL SZ 0 L18IN NONABSORBABLE GRN L26MM MO-6 CX45D

## (undated) DEVICE — SUTURE SZ 0 27IN 5/8 CIR UR-6  TAPER PT VIOLET ABSRB VICRYL J603H

## (undated) DEVICE — YANKAUER,OPEN TIP,W/O VENT,STERILE: Brand: MEDLINE INDUSTRIES, INC.

## (undated) DEVICE — SURGICAL SET UP - SURE SET: Brand: MEDLINE INDUSTRIES, INC.

## (undated) DEVICE — SCISSORS ENDOSCP DIA5MM CRV MPLR CAUT W/ RATCH HNDL

## (undated) DEVICE — 3M™ TEGADERM™ TRANSPARENT FILM DRESSING FRAME STYLE, 1626W, 4 IN X 4-3/4 IN (10 CM X 12 CM), 50/CT 4CT/CASE: Brand: 3M™ TEGADERM™

## (undated) DEVICE — SOLUTION IV IRRIG 500ML 0.9% SODIUM CHL 2F7123

## (undated) DEVICE — HYPODERMIC SAFETY NEEDLE: Brand: MAGELLAN

## (undated) DEVICE — Z CONVERTED USE 2271043 CONTAINER SPEC COLL 4OZ SCR ON LID PEEL PCH

## (undated) DEVICE — SYRINGE, LUER LOCK, 10ML: Brand: MEDLINE

## (undated) DEVICE — SUTURE MCRYL SZ 4-0 L27IN ABSRB UD L19MM PS-2 1/2 CIR PRIM Y426H

## (undated) DEVICE — SHEET,DRAPE,53X77,STERILE: Brand: MEDLINE

## (undated) DEVICE — SYSTEM SMK EVAC LAP TBNG FILTER HSNG BENT STYL PNK SEE CLR

## (undated) DEVICE — SUTURING DEVICE: Brand: FIXATE ™

## (undated) DEVICE — CHLORAPREP 26ML ORANGE

## (undated) DEVICE — NEEDLE HYPO 22GA L1.5IN BLK POLYPR HUB S STL REG BVL STR

## (undated) DEVICE — PLATE ES AD W 9FT CRD 2

## (undated) DEVICE — DEVICE CLSR 10/12MM XL PRT SYS SUT PASS ST DISP CARTER

## (undated) DEVICE — DRAPE,SPLIT ,77X120: Brand: MEDLINE

## (undated) DEVICE — MEDICINE CUP, GRADUATED, STER: Brand: MEDLINE

## (undated) DEVICE — CANNULA,OXY,ADULT,SUPERSOFT,W/7'TUB,SC: Brand: MEDLINE INDUSTRIES, INC.

## (undated) DEVICE — GLOVE ORANGE PI 8 1/2   MSG9085

## (undated) DEVICE — TISSUE RETRIEVAL SYSTEM: Brand: INZII RETRIEVAL SYSTEM

## (undated) DEVICE — CONMED SCOPE SAVER BITE BLOCK, 20X27 MM: Brand: SCOPE SAVER

## (undated) DEVICE — SYRINGE EPI 5CC GLS MTL TIP LOSS OF RESISTANCE

## (undated) DEVICE — GOWN,SIRUS,POLYRNF,BRTHSLV,XLN/XXL,18/CS: Brand: MEDLINE

## (undated) DEVICE — SUTURE VCRL SZ 3-0 L18IN ABSRB UD L26MM SH 1/2 CIR J864D

## (undated) DEVICE — GLOVE ORANGE PI 7   MSG9070

## (undated) DEVICE — E-Z CLEAN, NON-STICK, PTFE COATED, ELECTROSURGICAL BLADE ELECTRODE, MODIFIED EXTENDED INSULATION, 2.5 INCH (6.35 CM): Brand: MEGADYNE

## (undated) DEVICE — APPLICATOR MEDICATED 26 CC SOLUTION HI LT ORNG CHLORAPREP

## (undated) DEVICE — SPONGE,LAP,18"X18",DLX,XR,ST,5/PK,40/PK: Brand: MEDLINE

## (undated) DEVICE — GOWN,SIRUS,POLYRNF,BRTHSLV,XLN/XL,20/CS: Brand: MEDLINE

## (undated) DEVICE — INTENDED FOR TISSUE SEPARATION, AND OTHER PROCEDURES THAT REQUIRE A SHARP SURGICAL BLADE TO PUNCTURE OR CUT.: Brand: BARD-PARKER ® STAINLESS STEEL BLADES

## (undated) DEVICE — TROCAR: Brand: KII FIOS FIRST ENTRY

## (undated) DEVICE — CORD ES L10FT MPLR LAP

## (undated) DEVICE — DRAPE,T,LAPARO,TRANS,STERILE: Brand: MEDLINE

## (undated) DEVICE — YANKAUER,BULB TIP,W/O VENT,RIGID,STERILE: Brand: MEDLINE

## (undated) DEVICE — COVER LT HNDL BLU PLAS

## (undated) DEVICE — [HIGH FLOW INSUFFLATOR,  DO NOT USE IF PACKAGE IS DAMAGED,  KEEP DRY,  KEEP AWAY FROM SUNLIGHT,  PROTECT FROM HEAT AND RADIOACTIVE SOURCES.]: Brand: PNEUMOSURE

## (undated) DEVICE — SOLUTION IV 1000ML 0.9% SOD CHL

## (undated) DEVICE — Z DUP USE 2138772 BAND ANCHR FIX TISS ANULEX 201

## (undated) DEVICE — GAUZE,SPONGE,4"X4",8PLY,STRL,LF,10/TRAY: Brand: MEDLINE

## (undated) DEVICE — ELECTRODE LAP L36CM PTFE WIRE J HK CLEANCOAT

## (undated) DEVICE — 40583 XL ADVANCED TRENDELENBURG POSITIONING KIT: Brand: 40583 XL ADVANCED TRENDELENBURG POSITIONING KIT

## (undated) DEVICE — CABLE SURG L60CM EXTN NEUROMODULATION PRECIS SPECTR

## (undated) DEVICE — STAPLER SKIN H3.9MM WIRE DIA0.58MM CRWN 6.9MM 35 STPL ROT

## (undated) DEVICE — PUMP SUC IRR TBNG L10FT W/ HNDPC ASSEMB STRYKEFLOW 2

## (undated) DEVICE — PACK PROCEDURE SURG EXTREMITY MFFOP CUST

## (undated) DEVICE — TROCAR: Brand: KII OPTICAL ACCESS SYSTEM

## (undated) DEVICE — SURE SET-DOUBLE BASIN-LF: Brand: MEDLINE INDUSTRIES, INC.

## (undated) DEVICE — SOLUTION INJ LR VISIV 1000ML BG

## (undated) DEVICE — BOWL MED L 32OZ PLAS W/ MOLD GRAD EZ OPN PEEL PCH

## (undated) DEVICE — Z INACTIVE USE 2641839 CLIP INT M L POLYMER LOK LIG HEM O LOK

## (undated) DEVICE — 3M™ STERI-STRIP™ REINFORCED ADHESIVE SKIN CLOSURES, R1547, 1/2 IN X 4 IN (12 MM X 100 MM), 6 STRIPS/ENVELOPE: Brand: 3M™ STERI-STRIP™

## (undated) DEVICE — WRENCH SURG L76CM SPNL CRD HEX STIM SYS SURG EQUIP PRECIS

## (undated) DEVICE — FORCEP BX STD CAP 240CM RAD JAW 4

## (undated) DEVICE — ENDOSCOPIC KIT 2 12 FT OP4 DE2 GWN SYR

## (undated) DEVICE — MASTISOL ADHESIVE LIQ 2/3ML

## (undated) DEVICE — GLOVE ORANGE PI 7 1/2   MSG9075

## (undated) DEVICE — GLOVE ORANGE PI 8   MSG9080

## (undated) DEVICE — 3M™ IOBAN™ 2 ANTIMICROBIAL INCISE DRAPE 6650EZ: Brand: IOBAN™ 2

## (undated) DEVICE — COVER US PRB W13XL244CM SURGICAL INTRAOPERATIVE W RUBBERBAND

## (undated) DEVICE — SOLUTION ANTIFOG VIS SYS CLEARIFY LAPSCP

## (undated) DEVICE — TOWEL,OR,DSP,ST,BLUE,STD,4/PK,20PK/CS: Brand: MEDLINE

## (undated) DEVICE — 35CM LONG TUNNELING TOOL